# Patient Record
Sex: MALE | Race: WHITE
[De-identification: names, ages, dates, MRNs, and addresses within clinical notes are randomized per-mention and may not be internally consistent; named-entity substitution may affect disease eponyms.]

---

## 2016-06-08 VITALS — DIASTOLIC BLOOD PRESSURE: 79 MMHG | SYSTOLIC BLOOD PRESSURE: 139 MMHG

## 2017-01-03 ENCOUNTER — HOSPITAL ENCOUNTER (OUTPATIENT)
Dept: HOSPITAL 61 - SLPLAB | Age: 67
Discharge: HOME | End: 2017-01-03
Attending: PHYSICIAN ASSISTANT
Payer: MEDICARE

## 2017-01-03 DIAGNOSIS — R06.83: ICD-10-CM

## 2017-01-03 DIAGNOSIS — G47.33: Primary | ICD-10-CM

## 2017-01-03 PROCEDURE — 95810 POLYSOM 6/> YRS 4/> PARAM: CPT

## 2017-01-04 NOTE — SLEEP
DATE OF STUDY:  01/03/2017



ATTENDING PHYSICIAN:  Dr. Yanet Tan.



INDICATIONS:  The patient is 66 years old who weighs 229 pounds with a BMI of

46.  The patient's Dodson score was 8.  Sleep study was performed at [facility]

sleep lab to rule out CLEM.



During the night of the study, the patient spent 423 minutes in bed and slept

for 337 minutes with a sleep efficiency of 80%.  Sleep latency was 13 minutes

with a REM latency of 312 minutes.  Overall, sleep architecture showed increased

stage I sleep, normal stage II sleep, normal slow wave, and reduced REM sleep.



During the night of the study, the patient had one obstructive apnea, no mixed

or central apneas.  There were 7 hypopneas.  The patient's apnea hypopnea index

was 7 per hour.  Supine index 13 per hour with a REM index of 10 per hour.



Review of nocturnal oximetry study revealed a mean oxygen saturation of 96% with

the lowest of 87%.  18.5% of time oxygen saturation remained between 80% and 89%

and predominate was between 87-89%.



EKG monitoring revealed average heart rate of 68 beats per minute.  Normal sinus

rhythm.  No sustained arrhythmias were observed.



PLMS were seen at index of 68 per hour and 1 per hour caused EEG arousals.



Due to low AHI, the patient did not meet the criteria for CPAP initiation.



IMPRESSION:

1.  Mild sleep apnea-hypopnea syndrome with an AHI of 7 per hour.

2.  Mild nocturnal hypoxia, suspect secondary to obesity hypoventilation

syndrome.

3.  Severe periodic limb movements of sleep at an index of 68 per hour, but only

1 per hour caused EEG arousals.



RECOMMENDATIONS:

1.  The patient did not meet the criteria for CPAP initiation due to low AHI.

2.  Weight loss is first recommended to treat the patient's mild sleep apnea. 

In addition, the patient should also avoid sleeping in supine position.

3.  If patient remains clinically symptomatic despite above recommendations, 
then other treatment options at

that time would include a trial of an oral appliance or CPAP titration.

4.  Caution regarding driving until the patient's hypersomnia is resolved.

5.  PLMS does not need to be treated unless the patient has symptoms of restless

legs during the day.

 



______________________________

MOISÉS SHAH MD



DR:  CANDIDA/rosa elena  JOB#:  [id number] / [id number]

DD:  01/04/2017 09:04  DT:  01/04/2017 10:40



YANET Tellez MD

MTDD

## 2017-09-08 ENCOUNTER — HOSPITAL ENCOUNTER (EMERGENCY)
Dept: HOSPITAL 61 - ER | Age: 67
Discharge: HOME | End: 2017-09-08
Payer: MEDICARE

## 2017-09-08 ENCOUNTER — HOSPITAL ENCOUNTER (OUTPATIENT)
Dept: HOSPITAL 61 - SURG | Age: 67
Discharge: HOME | End: 2017-09-08
Attending: SURGERY
Payer: MEDICARE

## 2017-09-08 VITALS — WEIGHT: 240 LBS | HEIGHT: 69 IN | BODY MASS INDEX: 35.55 KG/M2

## 2017-09-08 VITALS — BODY MASS INDEX: 35.55 KG/M2 | HEIGHT: 69 IN | WEIGHT: 240 LBS

## 2017-09-08 VITALS — SYSTOLIC BLOOD PRESSURE: 112 MMHG | DIASTOLIC BLOOD PRESSURE: 72 MMHG

## 2017-09-08 VITALS — DIASTOLIC BLOOD PRESSURE: 90 MMHG | SYSTOLIC BLOOD PRESSURE: 140 MMHG

## 2017-09-08 DIAGNOSIS — M19.91: ICD-10-CM

## 2017-09-08 DIAGNOSIS — K42.9: Primary | ICD-10-CM

## 2017-09-08 DIAGNOSIS — Z91.041: ICD-10-CM

## 2017-09-08 DIAGNOSIS — Z88.6: ICD-10-CM

## 2017-09-08 DIAGNOSIS — E78.00: ICD-10-CM

## 2017-09-08 DIAGNOSIS — E11.9: ICD-10-CM

## 2017-09-08 DIAGNOSIS — E66.9: ICD-10-CM

## 2017-09-08 DIAGNOSIS — I10: ICD-10-CM

## 2017-09-08 DIAGNOSIS — Z88.8: ICD-10-CM

## 2017-09-08 DIAGNOSIS — Z88.5: ICD-10-CM

## 2017-09-08 DIAGNOSIS — K21.9: ICD-10-CM

## 2017-09-08 DIAGNOSIS — R33.9: Primary | ICD-10-CM

## 2017-09-08 DIAGNOSIS — J45.909: ICD-10-CM

## 2017-09-08 DIAGNOSIS — Z98.890: ICD-10-CM

## 2017-09-08 DIAGNOSIS — Z87.39: ICD-10-CM

## 2017-09-08 DIAGNOSIS — Z72.0: ICD-10-CM

## 2017-09-08 LAB
BACTERIA #/AREA URNS HPF: 0 /HPF
BILIRUB UR QL STRIP: NEGATIVE
GLUCOSE UR STRIP-MCNC: NEGATIVE MG/DL
NITRITE UR QL STRIP: NEGATIVE
PH UR STRIP: 6.5 [PH]
PROT UR STRIP-MCNC: NEGATIVE MG/DL
RBC #/AREA URNS HPF: 0 /HPF (ref 0–2)
SP GR UR STRIP: 1.01
UROBILINOGEN UR-MCNC: 0.2 MG/DL
WBC #/AREA URNS HPF: 0 /HPF (ref 0–4)

## 2017-09-08 PROCEDURE — S0028 INJECTION, FAMOTIDINE, 20 MG: HCPCS

## 2017-09-08 PROCEDURE — 49585: CPT

## 2017-09-08 PROCEDURE — 81001 URINALYSIS AUTO W/SCOPE: CPT

## 2017-09-08 PROCEDURE — A4215 STERILE NEEDLE: HCPCS

## 2017-09-08 PROCEDURE — 99283 EMERGENCY DEPT VISIT LOW MDM: CPT

## 2017-09-08 PROCEDURE — 82962 GLUCOSE BLOOD TEST: CPT

## 2017-09-08 PROCEDURE — P9612 CATHETERIZE FOR URINE SPEC: HCPCS

## 2017-09-08 NOTE — PDOC
BRIEF OPERATIVE NOTE


Pre-Op Diagnosis


#[id number]





umbilical hernia


umb hernia repair with mesh


k ponnuru


gen


ebl 10


ivf 1000


daniel well 


to rr stable.











RONI GRIFFIN MD Sep 8, 2017 09:42

## 2017-09-08 NOTE — OP
DATE OF SURGERY:  09/08/2017



PREOPERATIVE DIAGNOSIS:  Umbilical hernia.



POSTOPERATIVE DIAGNOSIS:  Umbilical hernia.



PROCEDURE:  Umbilical hernia repair with mesh.



SURGEON:  Roni Griffin MD



ANESTHESIA:  General.



ESTIMATED BLOOD LOSS:  10 mL



IV FLUIDS:  1000 mL



INDICATIONS:  The patient is a 67-year-old male who presents with an umbilical

hernia that he would like to have it repaired, it is symptomatic.



FINDINGS:  He had a 2 x 3 cm hernia defect.  His fascia was very thin.  He has a

diastasis recti and his subcutaneous fat was also very sparse.  The dermis of

the umbilicus was adherent to the fascia and had to be removed with cautery.  At

the completion of the procedure, the umbilical skin appeared viable and healthy.



PROCEDURE IN DETAIL:  After informed consent was obtained, the patient was taken

to the operating room and placed in supine position.  After adequate induction

of general anesthesia, he was prepped and draped in usual sterile fashion.  A

supraumbilical skin incision was made with a scalpel.  Subcutaneous tissue

divided with cautery.  The fascia was quickly encountered.  He had a very

minimal amount of subcutaneous fat.  This was somewhat unexpected since his BMI

is 35.  The dermis of the umbilicus was  from the hernia sac and then

from its attachment to the fascia, there was minimal amount of subcutaneous

fat to preserve the blood supply to the dermis.  At this point, the fascial was

inspected.  He had a very small 2-3 mm defect above the primary defect, but the

primary defect measured 2 x 3 cm.  The hernia was repaired with an 8 cm Bard

Ventralex patch that was placed beneath the fascia and sutured in place with 8

interrupted U sutures using 0 Prolene to secure the periphery of the mesh to the

abdominal wall.  The area was irrigated.  The fascia was injected with local

anesthetic.  The skin had been injected with local anesthetic at the beginning

of the procedure.  The fascia was then closed over the mesh with 0 Prolene in a

running fashion and then the dermis of the umbilicus was tacked back down to the

fascia with 3-0 Vicryl x 2.  The skin was closed with 4-0 Monocryl in

subcuticular fashion.  Sterile dressings were placed, which consisted of a

cotton ball, followed by Mastisol, Steri-Strips, and Tegaderm.  He tolerated the

procedure well.  He has been transferred in stable condition to the recovery

room.

 



______________________________

RONI GRIFFIN MD



DR:  VERA/rosa elena  JOB#:  [id number] / [id number]

DD:  09/08/2017 09:41  DT:  09/08/2017 10:06



YANET Tellez MD, JENNIFER TAN

## 2017-09-09 ENCOUNTER — HOSPITAL ENCOUNTER (INPATIENT)
Dept: HOSPITAL 61 - ER | Age: 67
LOS: 4 days | Discharge: HOME | DRG: 394 | End: 2017-09-13
Attending: FAMILY MEDICINE | Admitting: FAMILY MEDICINE
Payer: MEDICARE

## 2017-09-09 VITALS — WEIGHT: 240 LBS | HEIGHT: 69 IN | BODY MASS INDEX: 35.55 KG/M2

## 2017-09-09 DIAGNOSIS — E78.5: ICD-10-CM

## 2017-09-09 DIAGNOSIS — I10: ICD-10-CM

## 2017-09-09 DIAGNOSIS — L03.113: ICD-10-CM

## 2017-09-09 DIAGNOSIS — E11.9: ICD-10-CM

## 2017-09-09 DIAGNOSIS — K56.7: ICD-10-CM

## 2017-09-09 DIAGNOSIS — E86.0: ICD-10-CM

## 2017-09-09 DIAGNOSIS — R33.9: ICD-10-CM

## 2017-09-09 DIAGNOSIS — L03.311: ICD-10-CM

## 2017-09-09 DIAGNOSIS — K91.3: Primary | ICD-10-CM

## 2017-09-09 DIAGNOSIS — R06.6: ICD-10-CM

## 2017-09-09 DIAGNOSIS — Z79.4: ICD-10-CM

## 2017-09-09 DIAGNOSIS — Z88.6: ICD-10-CM

## 2017-09-09 DIAGNOSIS — Z88.8: ICD-10-CM

## 2017-09-09 PROCEDURE — 96361 HYDRATE IV INFUSION ADD-ON: CPT

## 2017-09-09 PROCEDURE — 83880 ASSAY OF NATRIURETIC PEPTIDE: CPT

## 2017-09-09 PROCEDURE — 84484 ASSAY OF TROPONIN QUANT: CPT

## 2017-09-09 PROCEDURE — 74176 CT ABD & PELVIS W/O CONTRAST: CPT

## 2017-09-09 PROCEDURE — 80048 BASIC METABOLIC PNL TOTAL CA: CPT

## 2017-09-09 PROCEDURE — 74022 RADEX COMPL AQT ABD SERIES: CPT

## 2017-09-09 PROCEDURE — G0479 DRUG TEST PRESUMP NOT OPT: HCPCS

## 2017-09-09 PROCEDURE — 83735 ASSAY OF MAGNESIUM: CPT

## 2017-09-09 PROCEDURE — 82553 CREATINE MB FRACTION: CPT

## 2017-09-09 PROCEDURE — 85027 COMPLETE CBC AUTOMATED: CPT

## 2017-09-09 PROCEDURE — 85025 COMPLETE CBC W/AUTO DIFF WBC: CPT

## 2017-09-09 PROCEDURE — 82962 GLUCOSE BLOOD TEST: CPT

## 2017-09-09 PROCEDURE — 80076 HEPATIC FUNCTION PANEL: CPT

## 2017-09-09 PROCEDURE — 84443 ASSAY THYROID STIM HORMONE: CPT

## 2017-09-09 PROCEDURE — 81001 URINALYSIS AUTO W/SCOPE: CPT

## 2017-09-09 PROCEDURE — 96374 THER/PROPH/DIAG INJ IV PUSH: CPT

## 2017-09-09 PROCEDURE — 85610 PROTHROMBIN TIME: CPT

## 2017-09-09 PROCEDURE — 93005 ELECTROCARDIOGRAM TRACING: CPT

## 2017-09-09 PROCEDURE — 83690 ASSAY OF LIPASE: CPT

## 2017-09-09 PROCEDURE — 80307 DRUG TEST PRSMV CHEM ANLYZR: CPT

## 2017-09-09 PROCEDURE — 71010: CPT

## 2017-09-09 PROCEDURE — P9612 CATHETERIZE FOR URINE SPEC: HCPCS

## 2017-09-09 PROCEDURE — 74000: CPT

## 2017-09-09 PROCEDURE — 36415 COLL VENOUS BLD VENIPUNCTURE: CPT

## 2017-09-09 PROCEDURE — 74020: CPT

## 2017-09-09 NOTE — PHYS DOC
Past Medical History


Past Medical History:  Diabetes-Type II, High Cholesterol, Hypertension, Other


Additional Past Medical Histor:  hernia


Past Surgical History:  Other


Additional Past Surgical Histo:  BILATERAL SHOULDER REPAIR; (L) KNEE REPAIR; 

hernia


Alcohol Use:  Occasionally


Drug Use:  None





Adult General


Chief Complaint


Chief Complaint:  MULTIPLE COMPLAINTS





HPI


HPI





Patient is a 67  year old  male who presents with nausea vomiting and 

a syncopal episode. He states his symptoms started tonight around 7:00 when he 

started having vomiting and vomited about 4 times nonbloody nonbilious 

material. In the course of vomiting he passed out. He also states he feels 

exhausted and short of breath like he just ran a brace. He denies any chest 

pain. He states that he had his hernia repair on 2017 and since 

that he's not had any flatus or stools. He also states his abdomen is 

distended. He also states he had to come in yesterday because of urinary 

retention to have his bladder drained. He states his been urinating fine 

without any difficulty.





Review of Systems


Review of Systems





Constitutional: Denies fever or chills []


Eyes: Denies change in visual acuity, redness, or eye pain []


HENT: Denies nasal congestion or sore throat []


Respiratory: Denies cough or shortness of breath []


Cardiovascular: No additional information not addressed in HPI []


GI: Positive for abdominal pain, nausea, vomiting, denies any bloody stools or 

diarrhea []


: Denies dysuria or hematuria []


Musculoskeletal: Denies back pain or joint pain []


Integument: Denies rash or skin lesions []


Neurologic: Denies headache, focal weakness or sensory changes []


Endocrine: Denies polyuria or polydipsia []





Current Medications


Current Medications





Current Medications








 Medications


  (Trade)  Dose


 Ordered  Sig/Luci  Start Time


 Stop Time Status Last Admin


Dose Admin


 


 Ondansetron HCl


  (Zofran)  4 mg  1X  ONCE  9/10/17 00:00


 9/10/17 00:01 DC 9/10/17 00:28


4 MG


 


 Sodium Chloride  1,000 ml @ 


 1,000 mls/hr  1X  ONCE  9/10/17 00:00


 9/10/17 00:59 DC 9/10/17 00:30


1,000 MLS/HR











Allergies


Allergies





Allergies








Coded Allergies Type Severity Reaction Last Updated Verified


 


  iodine Allergy Severe Anaphylaxis 17 Yes


 


  metformin Allergy Intermediate  17 Yes


 


  morphine Allergy Intermediate Nausea and Vomiting 17 Yes


 


  naproxen sodium Allergy Intermediate Rash 17 Yes


 


  simvastatin Allergy Intermediate  17 Yes











Physical Exam


Physical Exam





Constitutional: Well developed, well nourished, no acute distress, non-toxic 

appearance. []


HENT: Normocephalic, atraumatic, bilateral external ears normal, oropharynx 

moist, no oral exudates, nose normal. []


Eyes: PERRLA, EOMI, conjunctiva normal, no discharge. [] 


Neck: Normal range of motion, no tenderness, supple, no stridor. [] 


Cardiovascular:Heart rate regular rhythm, no murmur []


Lungs & Thorax:  Bilateral breath sounds clear to auscultation []


Abdomen: Bowel sounds normal, soft, distended with hypoactive occasional high-

pitched sounds, dressing over the umbilicus with mild erythema extending 

partially 6 cm below, no masses, no pulsatile masses. [] 


Skin: Warm, dry, no erythema, no rash. [] 


Back: No tenderness, no CVA tenderness. [] 


Extremities: No tenderness, no cyanosis, no clubbing, ROM intact, no edema. [] 


Neurologic: Alert and oriented X 3, normal motor function, normal sensory 

function, no focal deficits noted. []


Psychologic: Affect normal, judgement normal, mood normal. []





Current Patient Data


Vital Signs





 Vital Signs








  Date Time  Temp Pulse Resp B/P (MAP) Pulse Ox O2 Delivery O2 Flow Rate FiO2


 


9/10/17 03:05     88 Room Air  


 


9/10/17 02:30  76 11 132/73 (92)    


 


17 23:37 97.9       





 97.9       








Lab Values





 Laboratory Tests








Test


  17


23:48 9/10/17


02:45 9/10/17


03:56


 


White Blood Count


  17.6 x10^3/uL


(4.0-11.0)  H 


  


 


 


Red Blood Count


  5.73 x10^6/uL


(4.30-5.70)  H 


  


 


 


Hemoglobin


  17.2 g/dL


(13.0-17.5) 


  


 


 


Hematocrit


  51.7 %


(39.0-53.0) 


  


 


 


Mean Corpuscular Volume


  90 fL ()


  


  


 


 


Mean Corpuscular Hemoglobin 30 pg (25-35)    


 


Mean Corpuscular Hemoglobin


Concent 33 g/dL


(31-37) 


  


 


 


Red Cell Distribution Width


  13.9 %


(11.5-14.5) 


  


 


 


Platelet Count


  187 x10^3/uL


(140-400) 


  


 


 


Neutrophils (%) (Auto) 85 % (31-73)  H  


 


Lymphocytes (%) (Auto) 7 % (24-48)  L  


 


Monocytes (%) (Auto) 7 % (0-9)    


 


Eosinophils (%) (Auto) 1 % (0-3)    


 


Basophils (%) (Auto) 0 % (0-3)    


 


Neutrophils # (Auto)


  14.9 x10^3uL


(1.8-7.7)  H 


  


 


 


Lymphocytes # (Auto)


  1.2 x10^3/uL


(1.0-4.8) 


  


 


 


Monocytes # (Auto)


  1.3 x10^3/uL


(0.0-1.1)  H 


  


 


 


Eosinophils # (Auto)


  0.2 x10^3/uL


(0.0-0.7) 


  


 


 


Basophils # (Auto)


  0.1 x10^3/uL


(0.0-0.2) 


  


 


 


Prothrombin Time


  13.9 SEC


(11.7-14.0) 


  


 


 


Prothrombin Time INR 1.1 (0.8-1.1)    


 


Sodium Level


  139 mmol/L


(136-145) 


  


 


 


Potassium Level


  4.0 mmol/L


(3.5-5.1) 


  


 


 


Chloride Level


  97 mmol/L


()  L 


  


 


 


Carbon Dioxide Level


  36 mmol/L


(21-32)  H 


  


 


 


Anion Gap 6 (6-14)    


 


Blood Urea Nitrogen


  14 mg/dL


(8-26) 


  


 


 


Creatinine


  1.0 mg/dL


(0.7-1.3) 


  


 


 


Estimated GFR


(Cockcroft-Gault) 74.8  


  


  


 


 


Glucose Level


  142 mg/dL


(70-99)  H 


  


 


 


Calcium Level


  10.1 mg/dL


(8.5-10.1) 


  


 


 


Magnesium Level


  1.9 mg/dL


(1.8-2.4) 


  


 


 


Total Bilirubin


  0.5 mg/dL


(0.2-1.0) 


  


 


 


Direct Bilirubin


  < 0.1 mg/dL


(0.0-0.2) 


  


 


 


Aspartate Amino Transferase


(AST) 33 U/L (15-37)


  


  


 


 


Alanine Aminotransferase (ALT)


  33 U/L (16-63)


  


  


 


 


Alkaline Phosphatase


  79 U/L


() 


  


 


 


Creatine Kinase


  511 U/L


()  H 


  


 


 


Creatine Kinase MB (Mass)


  7.6 ng/mL


(0.0-3.6)  H 


  


 


 


Creatine Kinase MB Relative


Index 1.5 % (0-4)  


  


  


 


 


Troponin I Quantitative


  < 0.017 ng/mL


(0.000-0.055) 


  < 0.017 ng/mL


(0.000-0.055)


 


NT-Pro-B-Type Natriuretic


Peptide 119 pg/mL


(0-124) 


  


 


 


Total Protein


  7.5 g/dL


(6.4-8.2) 


  


 


 


Albumin


  4.2 g/dL


(3.4-5.0) 


  


 


 


Lipase


  140 U/L


() 


  


 


 


Thyroid Stimulating Hormone


(TSH) 4.216 uIU/mL


(0.358-3.74)  H 


  


 


 


Urine Collection Type  Unknown   


 


Urine Color  Yellow   


 


Urine Clarity  Clear   


 


Urine pH  7.0   


 


Urine Specific Gravity  1.010   


 


Urine Protein


  


  Negative mg/dL


(NEG-TRACE) 


 


 


Urine Glucose (UA)


  


  Negative mg/dL


(NEG) 


 


 


Urine Ketones (Stick)


  


  Negative mg/dL


(NEG) 


 


 


Urine Blood


  


  Negative (NEG)


  


 


 


Urine Nitrite


  


  Negative (NEG)


  


 


 


Urine Bilirubin


  


  Negative (NEG)


  


 


 


Urine Urobilinogen Dipstick


  


  0.2 mg/dL (0.2


mg/dL) 


 


 


Urine Leukocyte Esterase


  


  Negative (NEG)


  


 


 


Urine RBC


  


  Occ /HPF (0-2)


  


 


 


Urine WBC


  


  Occ /HPF (0-4)


  


 


 


Urine Squamous Epithelial


Cells 


  Few /LPF  


  


 


 


Urine Amorphous Sediment  Present /HPF   


 


Urine Bacteria


  


  0 /HPF (0-FEW)


  


 


 


Urine Opiates Screen  Neg (NEG)   


 


Urine Methadone Screen  Neg (NEG)   


 


Urine Barbiturates  Neg (NEG)   


 


Urine Phencyclidine Screen  Neg (NEG)   


 


Urine


Amphetamine/Methamphetamine 


  Neg (NEG)  


  


 


 


Urine Benzodiazepines Screen  Neg (NEG)   


 


Urine Cocaine Screen  Neg (NEG)   


 


Urine Cannabinoids Screen  Neg (NEG)   


 


Urine Ethyl Alcohol  Neg (NEG)   





 Laboratory Tests


17 23:48








 Laboratory Tests


17 23:48











EKG


EKG


EKG shows sinus rhythm with rate of 57 bpm without any ST elevations, T-wave 

inversions noted in leads aVL, V1 V2, normal axis,  ms, as interpreted 

by me.





Radiology/Procedures


Radiology/Procedures


 [facility]


 [address] [place] [address]


 [phone]


 


 IMAGING REPORT





 Signed





PATIENT: JUVE QUINTERO ACCOUNT: [id number] MRN#: [id number]


: 1950 LOCATION: ER AGE: 67


SEX: M EXAM DT: 09/10/17 ACCESSION#: [id number]


STATUS: REG ER ORD. PHYSICIAN: JAYANT WHEAT MD 


REASON: pain


PROCEDURE: CT ABDOMEN PELVIS WO CONTRAST





 


INDICATION: abd distension


 


COMPARISON: None.


 


TECHNIQUE: 


 


Axial CT images were obtained through the abdomen and pelvis without 


intravenous contrast.  


Limited assessment of solid organ structures and vasculature secondary to 


lack of intravenous contrast.


 


One or more of the following individualized dose reduction techniques were


utilized for this examination:  1. Automated exposure control;  2. 


Adjustment of the mA and/or kV according to patient size;  3. Use of 


iterative reconstruction technique.


 


FINDINGS:


 


Mild probable atelectasis at lung bases.


Mild calcific atherosclerosis without abdominal aortic aneurysm.


Small left greater than right fat-containing inguinal hernia.


Couple of Subcentimeter low-attenuation right lobe of liver lesion, not 


well characterized on this exam. No intrahepatic bile duct dilation.


Gallbladder is contracted.


No peripancreatic edema.


Spleen unremarkable.


No hydronephrosis.


Bladder is partially distended at time of exam.


There is some subcutaneous edema at the ventral abdominal wall with some 


subcutaneous air.


Colonic diverticulosis.


There is dilation of proximal loops of bowel with distal decompression. 


There is also dilatation of the stomach.


Degenerative changes spine. This contributes to multilevel central canal 


neural foraminal stenosis.


 


IMPRESSION:


 


1. Dilatation of the stomach and proximal small bowel loops with more 


distal decompression. This can be seen in small bowel obstruction.


 


 


2. There is subcutaneous air and edema anterior abdominal and pelvic wall.


Would correlate for possible causes such as posttraumatic or injection 


related. If no history of injection or trauma other possible causes 


include infection.


 


3. Subcentimeter low-attenuation liver lesions which are too small to 


characterize on this exam.


 


Electronically signed by: Janice Herndon MD (9/10/2017 2:30 AM) [facility]














DICTATED and SIGNED BY:     JANICE HERNDON MD


DATE:     09/10/17 0216





CC: JAYANT WHEAT MD; YANET HERNDON MD ~


Impressions:


Bowel obstruction


Syncope





Course & Med Decision Making


Course & Med Decision Making


Pertinent Labs and Imaging studies reviewed. (See chart for details)





Labs show any acute abnormality's. CT scan shows obstruction with an area of 

concern along the abdominal and pelvic wall. I spoke with Dr. Estrada regarding 

the CT scan, his vitals, his presentation and labs. Patient's being admitted 

for his bowel obstruction. His pain is controlled at this time. We will write 

interim orders for D5 half normal saline. Spoke with Dr. Yanet Herndon who 

agrees with the plan. I believe his syncopal episode occurred while he was 

vomiting is likely vasovagal in nature. Will put on telemetry and repeat 

troponins. He's been ER for 5 hours and has not had any other complaints.





Dragon Disclaimer


Dragon Disclaimer


This electronic medical record was generated, in whole or in part, using a 

voice recognition dictation system.





Departure


Departure


Impression:  


 Primary Impression:  


 Bowel obstruction


Disposition:   HOME, SELF-CARE


Admitting Physician:  Yanet Herndon


Condition:  STABLE


Referrals:  


YANET HERNDON MD (PCP)





Problem Qualifiers








 Primary Impression:  


 Bowel obstruction


 Intestinal obstruction type:  other intestinal obstruction  Qualified Codes:  

K56.69 - Other intestinal obstruction








JAYANT WHEAT MD Sep 9, 2017 23:47

## 2017-09-10 VITALS — SYSTOLIC BLOOD PRESSURE: 140 MMHG | DIASTOLIC BLOOD PRESSURE: 85 MMHG

## 2017-09-10 VITALS — SYSTOLIC BLOOD PRESSURE: 158 MMHG | DIASTOLIC BLOOD PRESSURE: 85 MMHG

## 2017-09-10 VITALS — DIASTOLIC BLOOD PRESSURE: 88 MMHG | SYSTOLIC BLOOD PRESSURE: 147 MMHG

## 2017-09-10 VITALS — DIASTOLIC BLOOD PRESSURE: 82 MMHG | SYSTOLIC BLOOD PRESSURE: 133 MMHG

## 2017-09-10 VITALS — DIASTOLIC BLOOD PRESSURE: 90 MMHG | SYSTOLIC BLOOD PRESSURE: 148 MMHG

## 2017-09-10 LAB
ALBUMIN SERPL-MCNC: 4.2 G/DL (ref 3.4–5)
ALP SERPL-CCNC: 79 U/L (ref 46–116)
ALT SERPL-CCNC: 33 U/L (ref 16–63)
ANION GAP SERPL CALC-SCNC: 6 MMOL/L (ref 6–14)
AST SERPL-CCNC: 33 U/L (ref 15–37)
BACTERIA #/AREA URNS HPF: 0 /HPF
BARBITURATES UR-MCNC: (no result) UG/ML
BASOPHILS # BLD AUTO: 0 X10^3/UL (ref 0–0.2)
BASOPHILS # BLD AUTO: 0.1 X10^3/UL (ref 0–0.2)
BASOPHILS NFR BLD: 0 % (ref 0–3)
BASOPHILS NFR BLD: 0 % (ref 0–3)
BENZODIAZ UR-MCNC: (no result) UG/L
BILIRUB DIRECT SERPL-MCNC: < 0.1 MG/DL (ref 0–0.2)
BILIRUB SERPL-MCNC: 0.5 MG/DL (ref 0.2–1)
BILIRUB UR QL STRIP: NEGATIVE
BUN SERPL-MCNC: 14 MG/DL (ref 8–26)
CALCIUM SERPL-MCNC: 10.1 MG/DL (ref 8.5–10.1)
CANNABINOIDS UR-MCNC: (no result) UG/L
CHLORIDE SERPL-SCNC: 97 MMOL/L (ref 98–107)
CK SERPL-CCNC: 511 U/L (ref 39–308)
CKMB MASS: 7.6 NG/ML (ref 0–3.6)
CO2 SERPL-SCNC: 36 MMOL/L (ref 21–32)
COCAINE UR-MCNC: (no result) NG/ML
CREAT SERPL-MCNC: 1 MG/DL (ref 0.7–1.3)
EOSINOPHIL NFR BLD: 1 % (ref 0–3)
EOSINOPHIL NFR BLD: 2 % (ref 0–3)
ERYTHROCYTE [DISTWIDTH] IN BLOOD BY AUTOMATED COUNT: 13.9 % (ref 11.5–14.5)
ERYTHROCYTE [DISTWIDTH] IN BLOOD BY AUTOMATED COUNT: 14.3 % (ref 11.5–14.5)
GFR SERPLBLD BASED ON 1.73 SQ M-ARVRAT: 74.8 ML/MIN
GLUCOSE SERPL-MCNC: 142 MG/DL (ref 70–99)
GLUCOSE UR STRIP-MCNC: NEGATIVE MG/DL
HCT VFR BLD CALC: 47.3 % (ref 39–53)
HCT VFR BLD CALC: 51.7 % (ref 39–53)
HGB BLD-MCNC: 15.6 G/DL (ref 13–17.5)
HGB BLD-MCNC: 17.2 G/DL (ref 13–17.5)
INR PPP: 1.1 (ref 0.8–1.1)
LYMPHOCYTES # BLD: 1.1 X10^3/UL (ref 1–4.8)
LYMPHOCYTES # BLD: 1.2 X10^3/UL (ref 1–4.8)
LYMPHOCYTES NFR BLD AUTO: 7 % (ref 24–48)
LYMPHOCYTES NFR BLD AUTO: 9 % (ref 24–48)
MAGNESIUM SERPL-MCNC: 1.9 MG/DL (ref 1.8–2.4)
MCH RBC QN AUTO: 30 PG (ref 25–35)
MCH RBC QN AUTO: 30 PG (ref 25–35)
MCHC RBC AUTO-ENTMCNC: 33 G/DL (ref 31–37)
MCHC RBC AUTO-ENTMCNC: 33 G/DL (ref 31–37)
MCV RBC AUTO: 90 FL (ref 79–100)
MCV RBC AUTO: 91 FL (ref 79–100)
METHADONE SERPL-MCNC: (no result) NG/ML
MONOCYTES NFR BLD: 7 % (ref 0–9)
MONOCYTES NFR BLD: 8 % (ref 0–9)
NEUTROPHILS NFR BLD AUTO: 81 % (ref 31–73)
NEUTROPHILS NFR BLD AUTO: 85 % (ref 31–73)
NITRITE UR QL STRIP: NEGATIVE
OPIATES UR-MCNC: (no result) NG/ML
PCP SERPL-MCNC: (no result) MG/DL
PH UR STRIP: 7 [PH]
PLATELET # BLD AUTO: 171 X10^3/UL (ref 140–400)
PLATELET # BLD AUTO: 187 X10^3/UL (ref 140–400)
POTASSIUM SERPL-SCNC: 4 MMOL/L (ref 3.5–5.1)
PROT SERPL-MCNC: 7.5 G/DL (ref 6.4–8.2)
PROT UR STRIP-MCNC: NEGATIVE MG/DL
PROTHROMBIN TIME: 13.9 SEC (ref 11.7–14)
RBC # BLD AUTO: 5.21 X10^6/UL (ref 4.3–5.7)
RBC # BLD AUTO: 5.73 X10^6/UL (ref 4.3–5.7)
RBC #/AREA URNS HPF: (no result) /HPF (ref 0–2)
SODIUM SERPL-SCNC: 139 MMOL/L (ref 136–145)
SP GR UR STRIP: 1.01
SQUAMOUS #/AREA URNS LPF: (no result) /LPF
UROBILINOGEN UR-MCNC: 0.2 MG/DL
WBC # BLD AUTO: 12 X10^3/UL (ref 4–11)
WBC # BLD AUTO: 17.6 X10^3/UL (ref 4–11)
WBC #/AREA URNS HPF: (no result) /HPF (ref 0–4)

## 2017-09-10 PROCEDURE — 0D9670Z DRAINAGE OF STOMACH WITH DRAINAGE DEVICE, VIA NATURAL OR ARTIFICIAL OPENING: ICD-10-PCS | Performed by: RADIOLOGY

## 2017-09-10 RX ADMIN — INSULIN ASPART SCH UNITS: 100 INJECTION, SOLUTION INTRAVENOUS; SUBCUTANEOUS at 17:00

## 2017-09-10 RX ADMIN — FENTANYL CITRATE PRN MCG: 50 INJECTION INTRAMUSCULAR; INTRAVENOUS at 20:31

## 2017-09-10 RX ADMIN — BACITRACIN SCH MLS/HR: 5000 INJECTION, POWDER, FOR SOLUTION INTRAMUSCULAR at 16:33

## 2017-09-10 RX ADMIN — INSULIN ASPART SCH UNITS: 100 INJECTION, SOLUTION INTRAVENOUS; SUBCUTANEOUS at 12:00

## 2017-09-10 RX ADMIN — FENTANYL CITRATE PRN MCG: 50 INJECTION INTRAMUSCULAR; INTRAVENOUS at 11:00

## 2017-09-10 RX ADMIN — FENTANYL CITRATE PRN MCG: 50 INJECTION INTRAMUSCULAR; INTRAVENOUS at 16:39

## 2017-09-10 RX ADMIN — BACITRACIN SCH MLS/HR: 5000 INJECTION, POWDER, FOR SOLUTION INTRAMUSCULAR at 18:45

## 2017-09-10 RX ADMIN — ONDANSETRON PRN MG: 2 INJECTION INTRAMUSCULAR; INTRAVENOUS at 20:31

## 2017-09-10 RX ADMIN — FENTANYL CITRATE PRN MCG: 50 INJECTION INTRAMUSCULAR; INTRAVENOUS at 13:42

## 2017-09-10 RX ADMIN — ONDANSETRON PRN MG: 2 INJECTION INTRAMUSCULAR; INTRAVENOUS at 12:27

## 2017-09-10 NOTE — RAD
INDICATION: abd distension

 

COMPARISON: None.

 

TECHNIQUE: 

 

Axial CT images were obtained through the abdomen and pelvis without 

intravenous contrast.  

Limited assessment of solid organ structures and vasculature secondary to 

lack of intravenous contrast.

 

One or more of the following individualized dose reduction techniques were

utilized for this examination:  1. Automated exposure control;  2. 

Adjustment of the mA and/or kV according to patient size;  3. Use of 

iterative reconstruction technique.

 

FINDINGS:

 

Mild probable atelectasis at lung bases.

Mild calcific atherosclerosis without abdominal aortic aneurysm.

Small left greater than right fat-containing inguinal hernia.

Couple of Subcentimeter low-attenuation right lobe of liver lesion, not 

well characterized on this exam. No intrahepatic bile duct dilation.

Gallbladder is contracted.

No peripancreatic edema.

Spleen unremarkable.

No hydronephrosis.

Bladder is partially distended at time of exam.

There is some subcutaneous edema at the ventral abdominal wall with some 

subcutaneous air.

Colonic diverticulosis.

There is dilation of proximal loops of bowel with distal decompression. 

There is also dilatation of the stomach.

Degenerative changes spine. This contributes to multilevel central canal 

neural foraminal stenosis.

 

IMPRESSION:

 

1. Dilatation of the stomach and proximal small bowel loops with more 

distal decompression. This can be seen in small bowel obstruction.

 

 

2. There is subcutaneous air and edema anterior abdominal and pelvic wall.

Would correlate for possible causes such as posttraumatic or injection 

related. If no history of injection or trauma other possible causes 

include infection.

 

3. Subcentimeter low-attenuation liver lesions which are too small to 

characterize on this exam.

 

Electronically signed by: Jeremiah Tan MD (9/10/2017 2:30 AM) [facility]

## 2017-09-10 NOTE — RAD
Portable abdomen, 9/10/2017, 10:08 AM:



History: Check NG tube placement



Comparison is made to a study from earlier the same day. An NG tube has been

inserted with its tip overlying the fundal aspect of the stomach. A sidehole

lies near the level of the GE junction. There is a moderate amount of stool in

the right colon. The abdominal gas pattern is otherwise unremarkable.



IMPRESSION: Interval insertion of an NG tube extending into the fundus of the

stomach.

## 2017-09-10 NOTE — RAD
Abdomen, 2 views, 9/10/2017:



History: Abdominal distention



There is gas in the colon without significant colonic distention. There is

very little small bowel gas. Gastric and mild small bowel distention seen on

the recent CT study is not visible radiographically since those structures are

fluid-filled. No free air is seen in the abdomen. There is no evidence of

organomegaly. Wire-like opacities overlying the upper pelvis most likely lie

on the of the patient.





IMPRESSION: No acute radiographic abnormality is detected.

## 2017-09-10 NOTE — PDOC2
ASHU BAJWA APRN 9/10/17 0833:


CONSULT


Date of Consult


Date of Consult


DATE: 9/10/17 


TIME: 08:25





Reason for Consult


Reason for Consult:


s/p hernia, sbo





Referring Physician


Referring Physician:


ER





Identification/Chief Complaint


Chief Complaint


abdominal pain


Problems:  





Source


Source:  Chart review, Patient





History of Present Illness


Reason for Visit:


Underwent umbilical hernia repair on 9/8.  Discharged home, returned with 

urinary retention(now urinating without problem).  Reports hiccups since surgery

, abdominal distention, no flatus or stool.  Yesterday began vomiting and had a 

syncopal episode.  Currently still having bloating, hiccups, and nausea





Past Medical History


Cardiovascular:  HTN, Hyperlipidemia


Endocrine:  No pertinent hx





Past Surgical History


Past Surgical History:  Hernia Repair





Family History


Family History:  Other (noncontributory to current illness )





Social History


Quit


ALCOHOL:  rare


Drugs:  None





Current Problem List


Problem List


Problems


Medical Problems:


(1) Bowel obstruction


Status: Acute  











Current Medications


Current Medications





Current Medications


Sodium Chloride 1,000 ml @  1,000 mls/hr 1X  ONCE IV  Last administered on 9/10/

17at 00:30;  Start 9/10/17 at 00:00;  Stop 9/10/17 at 00:59;  Status DC


Ondansetron HCl (Zofran) 4 mg 1X  ONCE IV  Last administered on 9/10/17at 00:28

;  Start 9/10/17 at 00:00;  Stop 9/10/17 at 00:01;  Status DC


Ondansetron HCl (Zofran) 4 mg PRN Q8HRS  PRN IV NAUSEA/VOMITING;  Start 9/10/17 

at 05:30;  Stop 9/11/17 at 05:29


Fentanyl Citrate (Fentanyl 2ml Vial) 50 mcg PRN Q2HR  PRN IV PAIN;  Start 9/10/

17 at 05:30;  Stop 9/11/17 at 05:29


Dextrose/Sodium Chloride 1,000 ml @  100 mls/hr 1X  ONCE IV  Last administered 

on 9/10/17at 05:30;  Start 9/10/17 at 05:30;  Stop 9/10/17 at 15:29





Active Scripts


Active


Percocet 5-325 Mg Tablet (Oxycodone/Acetaminophen) 1 Each Tablet 1-2 Tab PO Q4-

6HRS


Reported


Alfuzosin Hcl 10 Mg Tab.er.24h 10 Mg PO DAILY


Ventolin Hfa Inhaler (Albuterol Sulfate) 18 Gm Hfa.aer.ad 18 Gm IH PRN DAILY


Ranitidine Hcl 150 Mg Tablet 150 Mg PO PRN PRN


Veramyst (Fluticasone Furoate) 10 Gm Spray.susp 10 Gm NS DAILY


Losartan Potassium 25 Mg Tablet 25 Mg PO QODAY


Januvia (Sitagliptin Phosphate) 100 Mg Tablet 50 Mg PO DAILY


Allergy (Cetirizine Hcl) 10 Mg Tablet 10 Mg PO DAILY





Allergies


Allergies:  


Coded Allergies:  


     iodine (Verified  Allergy, Severe, Anaphylaxis, 9/8/17)


     metformin (Verified  Allergy, Intermediate, 9/8/17)


 "FOOT CRAMPS"


     morphine (Verified  Allergy, Intermediate, Nausea and Vomiting, 9/8/17)


     naproxen sodium (Verified  Allergy, Intermediate, Rash, 9/8/17)


     simvastatin (Verified  Allergy, Intermediate, 9/8/17)


 MUSCLE CRAMPING





ROS


General:  YES: Chills, Other (subjective fevers )


PSYCHOLOGICAL ROS:  No: Anxiety, Depression


Eyes:  No Blurry vision, No Double vision


HEENT:  No: Heacaches, Sore Throat


Hematological and Lymphatic:  YES: Blood Clots (testicular), 


   No: Bleeding Problems


Respiratory:  YES: Shortness of breath, 


   No: Cough


Cardiovascular:  No Chest Pain, No Palpitations


Gastrointestinal:  Yes Other (see hpi)


Genitourinary:  YES Dysuria, 


   No Hematuria


Musculoskeletal:  No Joint Pain, No Muscle Pain


Neurological:  No Confusion, No Numbness/Tingling


Skin:  No Pruritus, No Rash





Physical Exam


General:  Alert, Oriented X3, Cooperative, No acute distress


HEENT:  PERRLA, Mucous membr. moist/pink


Lungs:  Clear to auscultation, Normal air movement


Heart:  Regular rate, Normal S1, Normal S2


Abdomen:  Soft, Other (moderate distention, tenderness to ruq/mild RLQ, umbo 

incision c/d/i, no erythema, no guarding or rebound)


Extremities:  No clubbing, No cyanosis


Skin:  No rashes, No breakdown


Neuro:  Normal gait, Normal speech


Psych/Mental Status:  Mental status NL, Mood NL


MUSCULOSKELETAL:  No deformity, No swelling





Vitals


VITALS





Vital Signs








  Date Time  Temp Pulse Resp B/P (MAP) Pulse Ox O2 Delivery O2 Flow Rate FiO2


 


9/10/17 07:00 98.8 75 18 133/82 (99) 93 Nasal Cannula 2.0 





 98.8       











Labs


Labs





Laboratory Tests








Test


  9/9/17


23:48 9/10/17


02:45 9/10/17


03:56 9/10/17


05:35


 


White Blood Count


  17.6 x10^3/uL


(4.0-11.0) 


  


  


 


 


Red Blood Count


  5.73 x10^6/uL


(4.30-5.70) 


  


  


 


 


Hemoglobin


  17.2 g/dL


(13.0-17.5) 


  


  


 


 


Hematocrit


  51.7 %


(39.0-53.0) 


  


  


 


 


Mean Corpuscular Volume 90 fL ()    


 


Mean Corpuscular Hemoglobin 30 pg (25-35)    


 


Mean Corpuscular Hemoglobin


Concent 33 g/dL


(31-37) 


  


  


 


 


Red Cell Distribution Width


  13.9 %


(11.5-14.5) 


  


  


 


 


Platelet Count


  187 x10^3/uL


(140-400) 


  


  


 


 


Neutrophils (%) (Auto) 85 % (31-73)    


 


Lymphocytes (%) (Auto) 7 % (24-48)    


 


Monocytes (%) (Auto) 7 % (0-9)    


 


Eosinophils (%) (Auto) 1 % (0-3)    


 


Basophils (%) (Auto) 0 % (0-3)    


 


Neutrophils # (Auto)


  14.9 x10^3uL


(1.8-7.7) 


  


  


 


 


Lymphocytes # (Auto)


  1.2 x10^3/uL


(1.0-4.8) 


  


  


 


 


Monocytes # (Auto)


  1.3 x10^3/uL


(0.0-1.1) 


  


  


 


 


Eosinophils # (Auto)


  0.2 x10^3/uL


(0.0-0.7) 


  


  


 


 


Basophils # (Auto)


  0.1 x10^3/uL


(0.0-0.2) 


  


  


 


 


Prothrombin Time


  13.9 SEC


(11.7-14.0) 


  


  


 


 


Prothromb Time International


Ratio 1.1 (0.8-1.1) 


  


  


  


 


 


Sodium Level


  139 mmol/L


(136-145) 


  


  


 


 


Potassium Level


  4.0 mmol/L


(3.5-5.1) 


  


  


 


 


Chloride Level


  97 mmol/L


() 


  


  


 


 


Carbon Dioxide Level


  36 mmol/L


(21-32) 


  


  


 


 


Anion Gap 6 (6-14)    


 


Blood Urea Nitrogen


  14 mg/dL


(8-26) 


  


  


 


 


Creatinine


  1.0 mg/dL


(0.7-1.3) 


  


  


 


 


Estimated GFR


(Cockcroft-Gault) 74.8 


  


  


  


 


 


Glucose Level


  142 mg/dL


(70-99) 


  


  


 


 


Calcium Level


  10.1 mg/dL


(8.5-10.1) 


  


  


 


 


Magnesium Level


  1.9 mg/dL


(1.8-2.4) 


  


  


 


 


Total Bilirubin


  0.5 mg/dL


(0.2-1.0) 


  


  


 


 


Direct Bilirubin


  < 0.1 mg/dL


(0.0-0.2) 


  


  


 


 


Aspartate Amino Transf


(AST/SGOT) 33 U/L (15-37) 


  


  


  


 


 


Alanine Aminotransferase


(ALT/SGPT) 33 U/L (16-63) 


  


  


  


 


 


Alkaline Phosphatase


  79 U/L


() 


  


  


 


 


Creatine Kinase


  511 U/L


() 


  


  


 


 


Creatine Kinase MB (Mass)


  7.6 ng/mL


(0.0-3.6) 


  


  


 


 


Creatine Kinase MB Relative


Index 1.5 % (0-4) 


  


  


  


 


 


Troponin I Quantitative


  < 0.017 ng/mL


(0.000-0.055) 


  < 0.017 ng/mL


(0.000-0.055) < 0.017 ng/mL


(0.000-0.055)


 


NT-Pro-B-Type Natriuretic


Peptide 119 pg/mL


(0-124) 


  


  


 


 


Total Protein


  7.5 g/dL


(6.4-8.2) 


  


  


 


 


Albumin


  4.2 g/dL


(3.4-5.0) 


  


  


 


 


Lipase


  140 U/L


() 


  


  


 


 


Thyroid Stimulating Hormone


(TSH) 4.216 uIU/mL


(0.358-3.74) 


  


  


 


 


Urine Collection Type  Unknown   


 


Urine Color  Yellow   


 


Urine Clarity  Clear   


 


Urine pH  7.0   


 


Urine Specific Gravity  1.010   


 


Urine Protein


  


  Negative mg/dL


(NEG-TRACE) 


  


 


 


Urine Glucose (UA)


  


  Negative mg/dL


(NEG) 


  


 


 


Urine Ketones (Stick)


  


  Negative mg/dL


(NEG) 


  


 


 


Urine Blood  Negative (NEG)   


 


Urine Nitrite  Negative (NEG)   


 


Urine Bilirubin  Negative (NEG)   


 


Urine Urobilinogen Dipstick


  


  0.2 mg/dL (0.2


mg/dL) 


  


 


 


Urine Leukocyte Esterase  Negative (NEG)   


 


Urine RBC  Occ /HPF (0-2)   


 


Urine WBC  Occ /HPF (0-4)   


 


Urine Squamous Epithelial


Cells 


  Few /LPF 


  


  


 


 


Urine Amorphous Sediment  Present /HPF   


 


Urine Bacteria  0 /HPF (0-FEW)   


 


Urine Opiates Screen  Neg (NEG)   


 


Urine Methadone Screen  Neg (NEG)   


 


Urine Barbiturates  Neg (NEG)   


 


Urine Phencyclidine Screen  Neg (NEG)   


 


Urine


Amphetamine/Methamphetamine 


  Neg (NEG) 


  


  


 


 


Urine Benzodiazepines Screen  Neg (NEG)   


 


Urine Cocaine Screen  Neg (NEG)   


 


Urine Cannabinoids Screen  Neg (NEG)   


 


Urine Ethyl Alcohol  Neg (NEG)   


 


Test


  9/10/17


07:31 


  


  


 


 


Glucose (Fingerstick)


  121 mg/dL


(70-99) 


  


  


 








Laboratory Tests








Test


  9/9/17


23:48 9/10/17


02:45 9/10/17


03:56 9/10/17


05:35


 


White Blood Count


  17.6 x10^3/uL


(4.0-11.0) 


  


  


 


 


Red Blood Count


  5.73 x10^6/uL


(4.30-5.70) 


  


  


 


 


Hemoglobin


  17.2 g/dL


(13.0-17.5) 


  


  


 


 


Hematocrit


  51.7 %


(39.0-53.0) 


  


  


 


 


Mean Corpuscular Volume 90 fL ()    


 


Mean Corpuscular Hemoglobin 30 pg (25-35)    


 


Mean Corpuscular Hemoglobin


Concent 33 g/dL


(31-37) 


  


  


 


 


Red Cell Distribution Width


  13.9 %


(11.5-14.5) 


  


  


 


 


Platelet Count


  187 x10^3/uL


(140-400) 


  


  


 


 


Neutrophils (%) (Auto) 85 % (31-73)    


 


Lymphocytes (%) (Auto) 7 % (24-48)    


 


Monocytes (%) (Auto) 7 % (0-9)    


 


Eosinophils (%) (Auto) 1 % (0-3)    


 


Basophils (%) (Auto) 0 % (0-3)    


 


Neutrophils # (Auto)


  14.9 x10^3uL


(1.8-7.7) 


  


  


 


 


Lymphocytes # (Auto)


  1.2 x10^3/uL


(1.0-4.8) 


  


  


 


 


Monocytes # (Auto)


  1.3 x10^3/uL


(0.0-1.1) 


  


  


 


 


Eosinophils # (Auto)


  0.2 x10^3/uL


(0.0-0.7) 


  


  


 


 


Basophils # (Auto)


  0.1 x10^3/uL


(0.0-0.2) 


  


  


 


 


Prothrombin Time


  13.9 SEC


(11.7-14.0) 


  


  


 


 


Prothromb Time International


Ratio 1.1 (0.8-1.1) 


  


  


  


 


 


Sodium Level


  139 mmol/L


(136-145) 


  


  


 


 


Potassium Level


  4.0 mmol/L


(3.5-5.1) 


  


  


 


 


Chloride Level


  97 mmol/L


() 


  


  


 


 


Carbon Dioxide Level


  36 mmol/L


(21-32) 


  


  


 


 


Anion Gap 6 (6-14)    


 


Blood Urea Nitrogen


  14 mg/dL


(8-26) 


  


  


 


 


Creatinine


  1.0 mg/dL


(0.7-1.3) 


  


  


 


 


Estimated GFR


(Cockcroft-Gault) 74.8 


  


  


  


 


 


Glucose Level


  142 mg/dL


(70-99) 


  


  


 


 


Calcium Level


  10.1 mg/dL


(8.5-10.1) 


  


  


 


 


Magnesium Level


  1.9 mg/dL


(1.8-2.4) 


  


  


 


 


Total Bilirubin


  0.5 mg/dL


(0.2-1.0) 


  


  


 


 


Direct Bilirubin


  < 0.1 mg/dL


(0.0-0.2) 


  


  


 


 


Aspartate Amino Transf


(AST/SGOT) 33 U/L (15-37) 


  


  


  


 


 


Alanine Aminotransferase


(ALT/SGPT) 33 U/L (16-63) 


  


  


  


 


 


Alkaline Phosphatase


  79 U/L


() 


  


  


 


 


Creatine Kinase


  511 U/L


() 


  


  


 


 


Creatine Kinase MB (Mass)


  7.6 ng/mL


(0.0-3.6) 


  


  


 


 


Creatine Kinase MB Relative


Index 1.5 % (0-4) 


  


  


  


 


 


Troponin I Quantitative


  < 0.017 ng/mL


(0.000-0.055) 


  < 0.017 ng/mL


(0.000-0.055) < 0.017 ng/mL


(0.000-0.055)


 


NT-Pro-B-Type Natriuretic


Peptide 119 pg/mL


(0-124) 


  


  


 


 


Total Protein


  7.5 g/dL


(6.4-8.2) 


  


  


 


 


Albumin


  4.2 g/dL


(3.4-5.0) 


  


  


 


 


Lipase


  140 U/L


() 


  


  


 


 


Thyroid Stimulating Hormone


(TSH) 4.216 uIU/mL


(0.358-3.74) 


  


  


 


 


Urine Collection Type  Unknown   


 


Urine Color  Yellow   


 


Urine Clarity  Clear   


 


Urine pH  7.0   


 


Urine Specific Gravity  1.010   


 


Urine Protein


  


  Negative mg/dL


(NEG-TRACE) 


  


 


 


Urine Glucose (UA)


  


  Negative mg/dL


(NEG) 


  


 


 


Urine Ketones (Stick)


  


  Negative mg/dL


(NEG) 


  


 


 


Urine Blood  Negative (NEG)   


 


Urine Nitrite  Negative (NEG)   


 


Urine Bilirubin  Negative (NEG)   


 


Urine Urobilinogen Dipstick


  


  0.2 mg/dL (0.2


mg/dL) 


  


 


 


Urine Leukocyte Esterase  Negative (NEG)   


 


Urine RBC  Occ /HPF (0-2)   


 


Urine WBC  Occ /HPF (0-4)   


 


Urine Squamous Epithelial


Cells 


  Few /LPF 


  


  


 


 


Urine Amorphous Sediment  Present /HPF   


 


Urine Bacteria  0 /HPF (0-FEW)   


 


Urine Opiates Screen  Neg (NEG)   


 


Urine Methadone Screen  Neg (NEG)   


 


Urine Barbiturates  Neg (NEG)   


 


Urine Phencyclidine Screen  Neg (NEG)   


 


Urine


Amphetamine/Methamphetamine 


  Neg (NEG) 


  


  


 


 


Urine Benzodiazepines Screen  Neg (NEG)   


 


Urine Cocaine Screen  Neg (NEG)   


 


Urine Cannabinoids Screen  Neg (NEG)   


 


Urine Ethyl Alcohol  Neg (NEG)   


 


Test


  9/10/17


07:31 


  


  


 


 


Glucose (Fingerstick)


  121 mg/dL


(70-99) 


  


  


 











Assessment/Plan


Assessment/Plan


s/p umbilical hernia repair


distention, vomiting, hiccups


sbo vs ileus


dehydration





will place NG for decompression, hydrate


repeat abdominal films in AM





TERRY VALLE MD 9/10/17 1359:


CONSULT


Allergies


Allergies:  


Coded Allergies:  


     iodine (Verified  Allergy, Severe, Anaphylaxis, 9/8/17)


     metformin (Verified  Allergy, Intermediate, 9/8/17)


 "FOOT CRAMPS"


     morphine (Verified  Allergy, Intermediate, Nausea and Vomiting, 9/8/17)


     naproxen sodium (Verified  Allergy, Intermediate, Rash, 9/8/17)


     simvastatin (Verified  Allergy, Intermediate, 9/8/17)


 MUSCLE CRAMPING





Assessment/Plan


Assessment/Plan


pt seen, interviewed and examined


will advance NG a little


follow exam


agree with above





Thanks for consult











ASHU BAJWA Sep 10, 2017 08:33


TERRY VALLE MD Sep 10, 2017 13:59

## 2017-09-10 NOTE — PDOC
GENERAL


General:


see dictated H&P. will start standard sliding scale insulin for diabetes as 

unable to give meds with ng tube.  will also start a catapress patch for hpt 

while npo. surgery consulted.


Problems:  





VITAL SIGNS


Vital Signs:





Vital Signs








  Date Time  Temp Pulse Resp B/P (MAP) Pulse Ox O2 Delivery O2 Flow Rate FiO2


 


9/10/17 07:20      Nasal Cannula 2.0 


 


9/10/17 07:00 98.8 75 18 133/82 (99) 93   





 98.8       











ALLERGIES


Allergies:





Allergies








Coded Allergies Type Severity Reaction Last Updated Verified


 


  iodine Allergy Severe Anaphylaxis 9/8/17 Yes


 


  metformin Allergy Intermediate  9/8/17 Yes


 


  morphine Allergy Intermediate Nausea and Vomiting 9/8/17 Yes


 


  naproxen sodium Allergy Intermediate Rash 9/8/17 Yes


 


  simvastatin Allergy Intermediate  9/8/17 Yes











MEDS


Medications:





Current Medications








 Medications


  (Trade)  Dose


 Ordered  Sig/Luci  Start Time


 Stop Time Status Last Admin


Dose Admin


 


 Dextrose/Sodium


 Chloride  1,000 ml @ 


 100 mls/hr  1X  ONCE  9/10/17 05:30


 9/10/17 15:29  9/10/17 05:30


100 MLS/HR


 


 Fentanyl Citrate


  (Fentanyl 2ml


 Vial)  50 mcg  PRN Q2HR  PRN  9/10/17 05:30


 9/11/17 05:29   


 


 


 Ondansetron HCl


  (Zofran)  4 mg  PRN Q8HRS  PRN  9/10/17 05:30


 9/11/17 05:29   


 


 


 Sodium Chloride  500 ml @ 


 500 mls/hr  1X  ONCE  9/10/17 08:45


 9/10/17 09:44  9/10/17 09:37


500 MLS/HR











LAB


Lab:





Laboratory Tests








Test


  9/9/17


23:48 9/10/17


02:45 9/10/17


03:56 9/10/17


05:35


 


White Blood Count


  17.6 x10^3/uL


(4.0-11.0) 


  


  


 


 


Red Blood Count


  5.73 x10^6/uL


(4.30-5.70) 


  


  


 


 


Hemoglobin


  17.2 g/dL


(13.0-17.5) 


  


  


 


 


Hematocrit


  51.7 %


(39.0-53.0) 


  


  


 


 


Mean Corpuscular Volume 90 fL ()    


 


Mean Corpuscular Hemoglobin 30 pg (25-35)    


 


Mean Corpuscular Hemoglobin


Concent 33 g/dL


(31-37) 


  


  


 


 


Red Cell Distribution Width


  13.9 %


(11.5-14.5) 


  


  


 


 


Platelet Count


  187 x10^3/uL


(140-400) 


  


  


 


 


Neutrophils (%) (Auto) 85 % (31-73)    


 


Lymphocytes (%) (Auto) 7 % (24-48)    


 


Monocytes (%) (Auto) 7 % (0-9)    


 


Eosinophils (%) (Auto) 1 % (0-3)    


 


Basophils (%) (Auto) 0 % (0-3)    


 


Neutrophils # (Auto)


  14.9 x10^3uL


(1.8-7.7) 


  


  


 


 


Lymphocytes # (Auto)


  1.2 x10^3/uL


(1.0-4.8) 


  


  


 


 


Monocytes # (Auto)


  1.3 x10^3/uL


(0.0-1.1) 


  


  


 


 


Eosinophils # (Auto)


  0.2 x10^3/uL


(0.0-0.7) 


  


  


 


 


Basophils # (Auto)


  0.1 x10^3/uL


(0.0-0.2) 


  


  


 


 


Prothrombin Time


  13.9 SEC


(11.7-14.0) 


  


  


 


 


Prothromb Time International


Ratio 1.1 (0.8-1.1) 


  


  


  


 


 


Sodium Level


  139 mmol/L


(136-145) 


  


  


 


 


Potassium Level


  4.0 mmol/L


(3.5-5.1) 


  


  


 


 


Chloride Level


  97 mmol/L


() 


  


  


 


 


Carbon Dioxide Level


  36 mmol/L


(21-32) 


  


  


 


 


Anion Gap 6 (6-14)    


 


Blood Urea Nitrogen


  14 mg/dL


(8-26) 


  


  


 


 


Creatinine


  1.0 mg/dL


(0.7-1.3) 


  


  


 


 


Estimated GFR


(Cockcroft-Gault) 74.8 


  


  


  


 


 


Glucose Level


  142 mg/dL


(70-99) 


  


  


 


 


Calcium Level


  10.1 mg/dL


(8.5-10.1) 


  


  


 


 


Magnesium Level


  1.9 mg/dL


(1.8-2.4) 


  


  


 


 


Total Bilirubin


  0.5 mg/dL


(0.2-1.0) 


  


  


 


 


Direct Bilirubin


  < 0.1 mg/dL


(0.0-0.2) 


  


  


 


 


Aspartate Amino Transf


(AST/SGOT) 33 U/L (15-37) 


  


  


  


 


 


Alanine Aminotransferase


(ALT/SGPT) 33 U/L (16-63) 


  


  


  


 


 


Alkaline Phosphatase


  79 U/L


() 


  


  


 


 


Creatine Kinase


  511 U/L


() 


  


  


 


 


Creatine Kinase MB (Mass)


  7.6 ng/mL


(0.0-3.6) 


  


  


 


 


Creatine Kinase MB Relative


Index 1.5 % (0-4) 


  


  


  


 


 


Troponin I Quantitative


  < 0.017 ng/mL


(0.000-0.055) 


  < 0.017 ng/mL


(0.000-0.055) < 0.017 ng/mL


(0.000-0.055)


 


NT-Pro-B-Type Natriuretic


Peptide 119 pg/mL


(0-124) 


  


  


 


 


Total Protein


  7.5 g/dL


(6.4-8.2) 


  


  


 


 


Albumin


  4.2 g/dL


(3.4-5.0) 


  


  


 


 


Lipase


  140 U/L


() 


  


  


 


 


Thyroid Stimulating Hormone


(TSH) 4.216 uIU/mL


(0.358-3.74) 


  


  


 


 


Urine Collection Type  Unknown   


 


Urine Color  Yellow   


 


Urine Clarity  Clear   


 


Urine pH  7.0   


 


Urine Specific Gravity  1.010   


 


Urine Protein


  


  Negative mg/dL


(NEG-TRACE) 


  


 


 


Urine Glucose (UA)


  


  Negative mg/dL


(NEG) 


  


 


 


Urine Ketones (Stick)


  


  Negative mg/dL


(NEG) 


  


 


 


Urine Blood  Negative (NEG)   


 


Urine Nitrite  Negative (NEG)   


 


Urine Bilirubin  Negative (NEG)   


 


Urine Urobilinogen Dipstick


  


  0.2 mg/dL (0.2


mg/dL) 


  


 


 


Urine Leukocyte Esterase  Negative (NEG)   


 


Urine RBC  Occ /HPF (0-2)   


 


Urine WBC  Occ /HPF (0-4)   


 


Urine Squamous Epithelial


Cells 


  Few /LPF 


  


  


 


 


Urine Amorphous Sediment  Present /HPF   


 


Urine Bacteria  0 /HPF (0-FEW)   


 


Urine Opiates Screen  Neg (NEG)   


 


Urine Methadone Screen  Neg (NEG)   


 


Urine Barbiturates  Neg (NEG)   


 


Urine Phencyclidine Screen  Neg (NEG)   


 


Urine


Amphetamine/Methamphetamine 


  Neg (NEG) 


  


  


 


 


Urine Benzodiazepines Screen  Neg (NEG)   


 


Urine Cocaine Screen  Neg (NEG)   


 


Urine Cannabinoids Screen  Neg (NEG)   


 


Urine Ethyl Alcohol  Neg (NEG)   


 


Test


  9/10/17


07:31 9/10/17


08:00 


  


 


 


Glucose (Fingerstick)


  121 mg/dL


(70-99) 


  


  


 


 


White Blood Count


  


  12.0 x10^3/uL


(4.0-11.0) 


  


 


 


Red Blood Count


  


  5.21 x10^6/uL


(4.30-5.70) 


  


 


 


Hemoglobin


  


  15.6 g/dL


(13.0-17.5) 


  


 


 


Hematocrit


  


  47.3 %


(39.0-53.0) 


  


 


 


Mean Corpuscular Volume  91 fL ()   


 


Mean Corpuscular Hemoglobin  30 pg (25-35)   


 


Mean Corpuscular Hemoglobin


Concent 


  33 g/dL


(31-37) 


  


 


 


Red Cell Distribution Width


  


  14.3 %


(11.5-14.5) 


  


 


 


Platelet Count


  


  171 x10^3/uL


(140-400) 


  


 


 


Neutrophils (%) (Auto)  81 % (31-73)   


 


Lymphocytes (%) (Auto)  9 % (24-48)   


 


Monocytes (%) (Auto)  8 % (0-9)   


 


Eosinophils (%) (Auto)  2 % (0-3)   


 


Basophils (%) (Auto)  0 % (0-3)   


 


Neutrophils # (Auto)


  


  9.7 x10^3uL


(1.8-7.7) 


  


 


 


Lymphocytes # (Auto)


  


  1.1 x10^3/uL


(1.0-4.8) 


  


 


 


Monocytes # (Auto)


  


  0.9 x10^3/uL


(0.0-1.1) 


  


 


 


Eosinophils # (Auto)


  


  0.2 x10^3/uL


(0.0-0.7) 


  


 


 


Basophils # (Auto)


  


  0.0 x10^3/uL


(0.0-0.2) 


  


 

















STEPHANIE LANGLEY MD Sep 10, 2017 09:56

## 2017-09-10 NOTE — EKG
[facility]

               [address] [place] [address]

Test Date:    2017               Test Time:    23:46:35

Pat Name:     JUVE QUINTERO              Department:   

Patient ID:   [id number]           Room:         422 

Gender:       M                        Technician:   

:          1950               Requested By: JAYANT WHEAT

Order Number: [id number]            Reading MD:   Sariah Lassiter

                                 Measurements

Intervals                              Axis          

Rate:         57                       P:            39

CT:           156                      QRS:          72

QRSD:         82                       T:            59

QT:           404                                    

QTc:          396                                    

                           Interpretive Statements

SINUS RHYTHM

NORMAL EKG

Electronically Signed On 2017 9:53:48 CDT by Sariah Lassiter

## 2017-09-10 NOTE — RAD
Portable chest, 9/9/2017:



History: Syncope



Comparison is made to a study from 9/12/2015. The heart size and pulmonary

vascularity are normal. There is bibasilar linear atelectasis or scarring. No

pulmonary consolidation is seen. There is no evidence of pleural fluid.





IMPRESSION: Minimal bibasilar linear atelectasis and/or scarring.

## 2017-09-11 VITALS — DIASTOLIC BLOOD PRESSURE: 84 MMHG | SYSTOLIC BLOOD PRESSURE: 132 MMHG

## 2017-09-11 VITALS — SYSTOLIC BLOOD PRESSURE: 140 MMHG | DIASTOLIC BLOOD PRESSURE: 91 MMHG

## 2017-09-11 VITALS — SYSTOLIC BLOOD PRESSURE: 159 MMHG | DIASTOLIC BLOOD PRESSURE: 93 MMHG

## 2017-09-11 VITALS — DIASTOLIC BLOOD PRESSURE: 75 MMHG | SYSTOLIC BLOOD PRESSURE: 126 MMHG

## 2017-09-11 VITALS — DIASTOLIC BLOOD PRESSURE: 81 MMHG | SYSTOLIC BLOOD PRESSURE: 124 MMHG

## 2017-09-11 VITALS — SYSTOLIC BLOOD PRESSURE: 124 MMHG | DIASTOLIC BLOOD PRESSURE: 84 MMHG

## 2017-09-11 LAB
ANION GAP SERPL CALC-SCNC: 7 MMOL/L (ref 6–14)
BASOPHILS # BLD AUTO: 0 X10^3/UL (ref 0–0.2)
BASOPHILS NFR BLD: 0 % (ref 0–3)
BUN SERPL-MCNC: 12 MG/DL (ref 8–26)
CALCIUM SERPL-MCNC: 8.3 MG/DL (ref 8.5–10.1)
CHLORIDE SERPL-SCNC: 100 MMOL/L (ref 98–107)
CO2 SERPL-SCNC: 29 MMOL/L (ref 21–32)
CREAT SERPL-MCNC: 0.9 MG/DL (ref 0.7–1.3)
EOSINOPHIL NFR BLD: 1 % (ref 0–3)
ERYTHROCYTE [DISTWIDTH] IN BLOOD BY AUTOMATED COUNT: 14 % (ref 11.5–14.5)
ERYTHROCYTE [DISTWIDTH] IN BLOOD BY AUTOMATED COUNT: 14 % (ref 11.5–14.5)
GFR SERPLBLD BASED ON 1.73 SQ M-ARVRAT: 84.4 ML/MIN
GLUCOSE SERPL-MCNC: 113 MG/DL (ref 70–99)
HCT VFR BLD CALC: 43.7 % (ref 39–53)
HCT VFR BLD CALC: 44.6 % (ref 39–53)
HGB BLD-MCNC: 15 G/DL (ref 13–17.5)
HGB BLD-MCNC: 15.4 G/DL (ref 13–17.5)
LYMPHOCYTES # BLD: 0.7 X10^3/UL (ref 1–4.8)
LYMPHOCYTES NFR BLD AUTO: 6 % (ref 24–48)
MCH RBC QN AUTO: 30 PG (ref 25–35)
MCH RBC QN AUTO: 30 PG (ref 25–35)
MCHC RBC AUTO-ENTMCNC: 34 G/DL (ref 31–37)
MCHC RBC AUTO-ENTMCNC: 35 G/DL (ref 31–37)
MCV RBC AUTO: 88 FL (ref 79–100)
MCV RBC AUTO: 88 FL (ref 79–100)
MONOCYTES NFR BLD: 9 % (ref 0–9)
NEUTROPHILS NFR BLD AUTO: 83 % (ref 31–73)
PLATELET # BLD AUTO: 160 X10^3/UL (ref 140–400)
PLATELET # BLD AUTO: 169 X10^3/UL (ref 140–400)
POTASSIUM SERPL-SCNC: 3.5 MMOL/L (ref 3.5–5.1)
RBC # BLD AUTO: 4.96 X10^6/UL (ref 4.3–5.7)
RBC # BLD AUTO: 5.08 X10^6/UL (ref 4.3–5.7)
SODIUM SERPL-SCNC: 136 MMOL/L (ref 136–145)
WBC # BLD AUTO: 12.2 X10^3/UL (ref 4–11)
WBC # BLD AUTO: 12.6 X10^3/UL (ref 4–11)

## 2017-09-11 RX ADMIN — INSULIN ASPART SCH UNITS: 100 INJECTION, SOLUTION INTRAVENOUS; SUBCUTANEOUS at 08:00

## 2017-09-11 RX ADMIN — INSULIN ASPART SCH UNITS: 100 INJECTION, SOLUTION INTRAVENOUS; SUBCUTANEOUS at 17:00

## 2017-09-11 RX ADMIN — INSULIN ASPART SCH UNITS: 100 INJECTION, SOLUTION INTRAVENOUS; SUBCUTANEOUS at 12:00

## 2017-09-11 RX ADMIN — BACITRACIN SCH MLS/HR: 5000 INJECTION, POWDER, FOR SOLUTION INTRAMUSCULAR at 12:00

## 2017-09-11 RX ADMIN — HYDROMORPHONE HYDROCHLORIDE PRN MG: 2 INJECTION INTRAMUSCULAR; INTRAVENOUS; SUBCUTANEOUS at 21:43

## 2017-09-11 RX ADMIN — ENOXAPARIN SODIUM SCH MG: 40 INJECTION SUBCUTANEOUS at 21:00

## 2017-09-11 RX ADMIN — BACITRACIN SCH MLS/HR: 5000 INJECTION, POWDER, FOR SOLUTION INTRAMUSCULAR at 04:45

## 2017-09-11 RX ADMIN — HYDROMORPHONE HYDROCHLORIDE PRN MG: 2 INJECTION INTRAMUSCULAR; INTRAVENOUS; SUBCUTANEOUS at 17:12

## 2017-09-11 RX ADMIN — HYDROMORPHONE HYDROCHLORIDE PRN MG: 2 INJECTION INTRAMUSCULAR; INTRAVENOUS; SUBCUTANEOUS at 13:06

## 2017-09-11 NOTE — PDOC
Provider Note


Provider Note


+flatus.  his wife says his abdomen has been red around the incision since 

surgery and the nurses removed the bandage thinking he may be reacting to it.  

she thinks the redness is better.  no abd pain except at inc as expected


afeb


vss


wbc stable at ~12


does not appear ill, toxic


abd soft nd nt except mild at inc as expected


inc covered by dry steristrips.  erythema around the inc and lower abdomen--

nontender, not indurated


a/p


erythema,  poss reaction to bandage or prep used intra op.  wife says this 

started the day of surgery so this explanation is most likely.  infection less 

likely given timing, but given the persistence of the erythema, i have 

empirically started ancef.  


added lovenox for dvt proph


dc ng (xrays with air throughout colon, nonobstructive).  start clears.











RONI GRIFFIN MD Sep 11, 2017 10:56

## 2017-09-11 NOTE — PDOC
Provider Note


Provider Note


vss, some flatus- labs ok- will not use januvia at home re lower a1c 6.3, cost











YANET HERNDON MD Sep 11, 2017 09:26

## 2017-09-11 NOTE — RAD
Acute abdomen series, 9/11/2017:



History: Small bowel obstruction



Comparison is made to yesterday study. An NG tube remains in place extending

into the proximal aspect of the stomach. There is a moderate amount of gas and

stool scattered throughout the colon. No significant small bowel distention is

evident. No free air is seen in the abdomen. There is no evidence of

organomegaly.



The heart is within normal limits in size. There is minimal linear atelectasis

or scarring in the left base. No pulmonary consolidation is seen. There is no

evidence of pleural fluid.



IMPRESSION:

1. The NG tube extends into the proximal aspect of the stomach.

2. No acute abdominal abnormality is detected.

## 2017-09-12 VITALS — SYSTOLIC BLOOD PRESSURE: 119 MMHG | DIASTOLIC BLOOD PRESSURE: 77 MMHG

## 2017-09-12 VITALS — DIASTOLIC BLOOD PRESSURE: 73 MMHG | SYSTOLIC BLOOD PRESSURE: 108 MMHG

## 2017-09-12 VITALS — SYSTOLIC BLOOD PRESSURE: 128 MMHG | DIASTOLIC BLOOD PRESSURE: 84 MMHG

## 2017-09-12 VITALS — SYSTOLIC BLOOD PRESSURE: 119 MMHG | DIASTOLIC BLOOD PRESSURE: 66 MMHG

## 2017-09-12 VITALS — SYSTOLIC BLOOD PRESSURE: 135 MMHG | DIASTOLIC BLOOD PRESSURE: 75 MMHG

## 2017-09-12 VITALS — DIASTOLIC BLOOD PRESSURE: 68 MMHG | SYSTOLIC BLOOD PRESSURE: 128 MMHG

## 2017-09-12 RX ADMIN — HYDROMORPHONE HYDROCHLORIDE PRN MG: 2 INJECTION INTRAMUSCULAR; INTRAVENOUS; SUBCUTANEOUS at 00:55

## 2017-09-12 RX ADMIN — ENOXAPARIN SODIUM SCH MG: 40 INJECTION SUBCUTANEOUS at 21:00

## 2017-09-12 RX ADMIN — BACITRACIN SCH MLS/HR: 5000 INJECTION, POWDER, FOR SOLUTION INTRAMUSCULAR at 22:33

## 2017-09-12 RX ADMIN — INSULIN ASPART SCH UNITS: 100 INJECTION, SOLUTION INTRAVENOUS; SUBCUTANEOUS at 12:00

## 2017-09-12 RX ADMIN — BACITRACIN SCH MLS/HR: 5000 INJECTION, POWDER, FOR SOLUTION INTRAMUSCULAR at 00:54

## 2017-09-12 RX ADMIN — HYDROMORPHONE HYDROCHLORIDE PRN MG: 2 INJECTION INTRAMUSCULAR; INTRAVENOUS; SUBCUTANEOUS at 09:52

## 2017-09-12 RX ADMIN — INSULIN ASPART SCH UNITS: 100 INJECTION, SOLUTION INTRAVENOUS; SUBCUTANEOUS at 08:00

## 2017-09-12 RX ADMIN — BACITRACIN SCH MLS/HR: 5000 INJECTION, POWDER, FOR SOLUTION INTRAMUSCULAR at 12:00

## 2017-09-12 RX ADMIN — INSULIN ASPART SCH UNITS: 100 INJECTION, SOLUTION INTRAVENOUS; SUBCUTANEOUS at 16:58

## 2017-09-12 NOTE — PDOC
Provider Note


Provider Note


labs ok, bp good ,daniel cl so far- cont same, diet per surg











YANET HERNDON MD Sep 12, 2017 08:41

## 2017-09-12 NOTE — PDOC
Provider Note


Provider Note


I saw and examined him.


hiccups persist.  no belching.  no nausea.  doesn't feel distended.  +flatus.  

daniel clears


afeb


vss


no distress


abd soft nd nt no tympany


inc covered by dry steristrips.


erythema much improved


a/p


ileus, improving.


erythema abd skin around incision, most likely rxn to adhesive or prep, based 

on timing this is less likely infection





full liquids.











RONI GRIFFIN MD Sep 12, 2017 10:22

## 2017-09-13 VITALS
SYSTOLIC BLOOD PRESSURE: 130 MMHG | SYSTOLIC BLOOD PRESSURE: 130 MMHG | SYSTOLIC BLOOD PRESSURE: 130 MMHG | DIASTOLIC BLOOD PRESSURE: 83 MMHG | DIASTOLIC BLOOD PRESSURE: 83 MMHG | DIASTOLIC BLOOD PRESSURE: 83 MMHG | DIASTOLIC BLOOD PRESSURE: 83 MMHG | SYSTOLIC BLOOD PRESSURE: 130 MMHG

## 2017-09-13 VITALS — SYSTOLIC BLOOD PRESSURE: 137 MMHG | DIASTOLIC BLOOD PRESSURE: 84 MMHG

## 2017-09-13 VITALS — DIASTOLIC BLOOD PRESSURE: 80 MMHG | SYSTOLIC BLOOD PRESSURE: 137 MMHG

## 2017-09-13 LAB
ANION GAP SERPL CALC-SCNC: 7 MMOL/L (ref 6–14)
BUN SERPL-MCNC: 12 MG/DL (ref 8–26)
CALCIUM SERPL-MCNC: 8.4 MG/DL (ref 8.5–10.1)
CHLORIDE SERPL-SCNC: 103 MMOL/L (ref 98–107)
CO2 SERPL-SCNC: 29 MMOL/L (ref 21–32)
CREAT SERPL-MCNC: 0.8 MG/DL (ref 0.7–1.3)
ERYTHROCYTE [DISTWIDTH] IN BLOOD BY AUTOMATED COUNT: 13.4 % (ref 11.5–14.5)
GFR SERPLBLD BASED ON 1.73 SQ M-ARVRAT: 96.7 ML/MIN
GLUCOSE SERPL-MCNC: 98 MG/DL (ref 70–99)
HCT VFR BLD CALC: 42.3 % (ref 39–53)
HGB BLD-MCNC: 14.4 G/DL (ref 13–17.5)
MCH RBC QN AUTO: 30 PG (ref 25–35)
MCHC RBC AUTO-ENTMCNC: 34 G/DL (ref 31–37)
MCV RBC AUTO: 88 FL (ref 79–100)
PLATELET # BLD AUTO: 173 X10^3/UL (ref 140–400)
POTASSIUM SERPL-SCNC: 3.6 MMOL/L (ref 3.5–5.1)
RBC # BLD AUTO: 4.8 X10^6/UL (ref 4.3–5.7)
SODIUM SERPL-SCNC: 139 MMOL/L (ref 136–145)
WBC # BLD AUTO: 9.9 X10^3/UL (ref 4–11)

## 2017-09-13 RX ADMIN — INSULIN ASPART SCH UNITS: 100 INJECTION, SOLUTION INTRAVENOUS; SUBCUTANEOUS at 07:52

## 2017-09-13 RX ADMIN — HYDROMORPHONE HYDROCHLORIDE PRN MG: 2 INJECTION INTRAMUSCULAR; INTRAVENOUS; SUBCUTANEOUS at 01:53

## 2017-09-13 RX ADMIN — BACITRACIN SCH MLS/HR: 5000 INJECTION, POWDER, FOR SOLUTION INTRAMUSCULAR at 06:45

## 2017-09-13 NOTE — ACF
Admission Forms Criteria


                         INTESTINAL OBSTRUCTION





Clinical Indications for Admission to Inpatient Care





                                                                               

          ([place]/check or initial the applicable condition/criteria)


                            





Admission is indicated for 1 or more of the following (1)(2)(3)(4)(5)(6)(7):


[X] I.    Partial bowel obstruction 


II.   Complete bowel obstruction











Extended stay beyond goal length of stay may be needed for(3)(25):


[ ]a)    Identified etiology (eg, hernia, volvulus, cancer with obstruction) 

requiring intervention 


[ ]b)    Gallstone ileus (26)


[ ]c)    Surgical intervention (3)(4)(5)(7)(27)(28)(29)(30): 


[ ]d)    Acute comorbid illness (eg, electrolyte imbalance, hypovolemia, renal 

failure)











The original [employer] content created by [employer] has been revised. 


The portions of the content which have been revised are identified through the 

use of italic text or in bold, and [facility] 


has neither reviewed nor approved the modified material. All other unmodified 

content is copyright  [employer].





Please see references footnoted in the original [employer] edition 

2017


Admission Criteria Met?:  Yes











SARA UMANZOR Sep 13, 2017 01:43

## 2017-09-13 NOTE — PDOC
Provider Note


Provider Note


no abd pain. +bm.  hiccups better.  no n/v.  wants to go home.  ambulating 

independently in the halls. expresses gratitude for the care i have given him





his RUE arm had an IV that he says 'went bad' last night.  the iv was removed.  

the swelling and redness on his arm has improved per his report.  the arm is 

sore.





afeb


vss


wbc normal


rue with mild erythema and tenderness and induration.  no fluctuance no pus at 

iv site.  no skin necrosis


abd soft nd nt


the inc is healing as expected. min amt of dry necrosis at skin edges (i 

removed the steristrips)


erythema on abd wall resolved. 


a/p


soft diet.


dc home on oral clinda for poss abd skin cellulitis and poss rue cellulitis.  

instructed him to call if the arm worsens--see dc instructions for more info.











RONI GRIFFIN MD Sep 13, 2017 11:25

## 2017-09-13 NOTE — PDOC
Provider Note


Provider Note


vss, no temp, labs good- on fl now- dc iv fluid, dc when surg ok











YANET HERNDON MD Sep 13, 2017 08:33

## 2017-09-18 NOTE — HP
ADMIT DATE:  09/10/2017



DATE OF SERVICE:  09/10/2017



CHIEF COMPLAINT AND HISTORY OF PRESENT ILLNESS:  This is a 67-year-old male who

is a patient of Dr. Tan, who I am covering for on the date of the patient's

admission.  The patient presented to the Emergency Room with complaints of

nausea, vomiting and a syncopal episode.  The patient has a history of having

umbilical hernia surgery on 09/08/2017.  The patient states that he has not had

a bowel movement or flatulence since his surgery and has been having increased

abdominal distention since.  The patient presented to the ER on 09/07/2017 with

complaints of urinary retention.  The patient was straight cathed on that day

and has had no further complaints of urinary retention since.  In the Emergency

Room, a CT scan was performed, which revealed a bowel obstruction.  The patient

was admitted to the hospital for evaluation and treatment.



PAST MEDICAL HISTORY:  As mentioned above, the patient underwent hernia repair

on 09/08/2017.  The patient has a history of hypertension.



ALLERGIES:  IODINE, METFORMIN, MORPHINE, NAPROXEN, SODIUM, SIMVASTATIN.



FAMILY HISTORY:  Noncontributory.



REVIEW OF SYSTEMS:  As mentioned above.



PHYSICAL EXAMINATION:

GENERAL:  The patient is a well-developed and well-nourished male who is in no

apparent distress on the morning of my examination.

VITAL SIGNS:  Stable this morning with mildly elevated blood pressure at 141/79.

HEENT:  Unremarkable.

NECK:  Supple, without adenopathy.

CHEST:  Clear to auscultation bilaterally.  Respirations are even and unlabored.

HEART:  Regular rate and rhythm without S3, S4 or murmur.

ABDOMEN:  Distended, tender throughout without hepatosplenomegaly or mass

present.

EXTREMITIES:  Without cyanosis, clubbing or edema.

NEUROLOGIC:  Grossly intact.



IMPRESSION:  Bowel obstruction following umbilical hernia repair on 09/08/2017.



PLAN:  The patient has been admitted, Surgery team has been consulted.  An NG

tube has been placed for decompression.  The patient will be placed on Catapres

for his blood pressure and we will change his D5 to sliding scale NovoLog.  The

patient will remain on telemetry until the bowel obstruction is cleared.  We

will continue to monitor the patient closely throughout his hospital stay.

 



______________________________

STEPHANIE LANGLEY MD



DR:  BOZENA/rosa elena  JOB#:  [id number] / [id number]

DD:  09/18/2017 11:54  DT:  09/18/2017 12:31

## 2017-09-20 NOTE — DS
DATE OF DISCHARGE:  09/13/2017



DATE OF ADMISSION:  09/10/2017



DATE OF DISCHARGE:  09/13/2017



HOSPITAL SUMMARY:  A 67-year-old white male just a few days out from

laparoscopic hernia repair, came in with vomiting and evidence of some degree of

small-bowel obstruction by CT scan.  CBC showed leukocytosis that resolved and

chemistry studies were all within normal limits.  Urine drug screen was

negative.  Urinalysis was normal as well.  He responded to IV fluids and NG

suction for a few days and was able to advance his diet from liquids to some

food and was able to be discharged and followed as an outpatient.



FINAL DIAGNOSES:  Ileus, status post hernia repair.



OPERATIONS, PROCEDURES, COMPLICATIONS:  None.



CONSULTATIONS:  Dr. Velez's group.



DISPOSITION:  All meds remain the same.  Pain meds per the surgeons ____ and

advance diet as tolerated.  Office followup with their office in 1 week

regarding sutures and wound status.

 



______________________________

YANET HERNDON MD



DR:  TETO/rosa elena  JOB#:  [id number] / [id number]

DD:  09/20/2017 07:52  DT:  09/20/2017 08:59

## 2018-10-05 ENCOUNTER — HOSPITAL ENCOUNTER (OUTPATIENT)
Dept: HOSPITAL 61 - KCIC | Age: 68
Discharge: HOME | End: 2018-10-05
Attending: FAMILY MEDICINE
Payer: MEDICARE

## 2018-10-05 DIAGNOSIS — M50.323: Primary | ICD-10-CM

## 2018-10-05 DIAGNOSIS — M25.78: ICD-10-CM

## 2018-10-05 DIAGNOSIS — M48.02: ICD-10-CM

## 2018-10-05 DIAGNOSIS — M47.892: ICD-10-CM

## 2018-10-05 PROCEDURE — 72050 X-RAY EXAM NECK SPINE 4/5VWS: CPT

## 2018-10-05 NOTE — [facility]
CERVICAL SPINE 5V

 

History: Left cervical radiculopathy, neck pain, shoulder pain

 

Comparison: None.

 

Findings:

6 views of the cervical spine are submitted. There is multilevel cervical 

facet degenerative change. There is adequate alignment of lateral masses 

C1 relative to C2. Tip of dens is obscured the odontoid view. There is 

moderate to severe degenerative disc disease C5-C6 and to lesser degree 

C6-7 with spondylosis at the same levels. Cervical vertebral body stature 

is preserved. AP alignment is within normal limits. There is narrowing of 

the left C5-6 neural foramen by uncovertebral osteophytes. There is 

atherosclerotic calcification of the right carotid artery in the neck.

 

Impression: 

 

1.  There is degenerative disc disease and spondylosis greatest C5-6 and 

C6-7.

2. There is narrowing of the left C5-6 neural foramen by uncovertebral 

osteophytes. There is multilevel facet degenerative change.

 

Electronically signed by: De Sousa MD (10/5/2018 1:04 PM) 

[id number]

## 2018-10-12 ENCOUNTER — HOSPITAL ENCOUNTER (OUTPATIENT)
Dept: HOSPITAL 61 - KCIC MRI | Age: 68
Discharge: HOME | End: 2018-10-12
Attending: FAMILY MEDICINE
Payer: MEDICARE

## 2018-10-12 DIAGNOSIS — M50.123: Primary | ICD-10-CM

## 2018-10-12 DIAGNOSIS — M48.02: ICD-10-CM

## 2018-10-12 DIAGNOSIS — Z98.890: ICD-10-CM

## 2018-10-12 PROCEDURE — 72141 MRI NECK SPINE W/O DYE: CPT

## 2018-10-12 NOTE — [facility]
MRI Cervical Spine Without Contrast

 

History: Cervical radiculopathy, acute pain and pain and numbness in the 

left upper extremity for 6 weeks

 

Technique: Multiplanar, multi sequential noncontrast MR imaging was 

performed of the cervical spine.

 

Comparison: None

 

Findings: Cervical vertebral body stature is preserved. Cervical cord 

caliber is within normal limits without convincing focal signal 

abnormality, somewhat limited evaluation at sites of spinal stenosis such 

as C4-5 and C5-6. There is no significant abnormality of the cervical 

medullary junction. AP alignment is within normal limits. There is 

mild-to-moderate degenerative disc disease at C5-C6, minimally at C6-7, 

mild disc desiccation at other levels.

 

C2-C3:  Neural foramina and spinal canal are adequate.

 

C3-C4:  Central canal is borderline about 10 mm on developmental basis. 

Neural foramina are overall adequate. 

 

C4-C5:  There is extrusion extending above and below the intervertebral 

disc space more centrally with indentation upon the ventral thecal sac and

cord, extrusion on the order of about 1.4 cm CC by 0.5 cm AP by 1.2 cm 

transverse. Central canal is significantly narrowed to about 0.3 to 0.4 

cm. Neural foramina are adequate.

 

C5-C6:  There is a broad prominent posterior protrusion more eccentric to 

the left lateral recess, probably a very shallow within extruded component

extending slightly above the intervertebral disc space. There is 

effacement of ventral subarachnoid space and indentation upon the ventral 

cord greater in the left lateral recess, significant narrowing the central

canal on the order of 0.4 to 0.5 cm, greater degree of left lateral recess

stenosis. There is likely component of left uncovertebral degenerative 

change. There is severe narrowing of the left neural foramen greater 

proximally. Right neural foramen is overall adequate.

 

C6-C7:   There is disc osteophyte complex and bulge/protrusion. Central 

canal is narrowed to 6 to 7 mm. There is uncovertebral degenerative change

on the left. Right neural foramen is adequate. There is moderate to severe

narrowing of the left neural foramen.

 

C7-T1:  There is a very shallow posterior central protrusion. Central 

canal is adequate about 11 mm. Neural foramina are adequate.

 

Impression: 

1.  There is severe spinal stenosis C4-5 and C5-C6, large extrusion at 

C4-5 and prominent protrusion C5-C6. There is indentation upon the ventral

cord. There is a lesser degree of spinal stenosis C6-7 as described.

2. There is more significant narrowing of the left C5-6 and C6-7 neural 

foramina.

3. There is mild-to-moderate degenerative disc disease C5-6 and to lesser 

degree at C6-7.

 

Results were called to the nurse in Dr. Tan's office October 12, 2018 

10:14 AM.

 

Electronically signed by: De Sousa MD (10/12/2018 10:14 AM) 

[facility]

## 2018-11-29 ENCOUNTER — HOSPITAL ENCOUNTER (OUTPATIENT)
Dept: HOSPITAL 61 - SURGPAT | Age: 68
End: 2018-11-29
Attending: NEUROLOGICAL SURGERY
Payer: MEDICARE

## 2018-11-29 DIAGNOSIS — Z01.818: Primary | ICD-10-CM

## 2018-11-29 DIAGNOSIS — M54.12: ICD-10-CM

## 2018-11-29 DIAGNOSIS — M48.02: ICD-10-CM

## 2018-11-29 LAB
ALBUMIN SERPL-MCNC: 3.9 G/DL (ref 3.4–5)
ALBUMIN/GLOB SERPL: 1.3 {RATIO} (ref 1–1.7)
ALP SERPL-CCNC: 78 U/L (ref 46–116)
ALT SERPL-CCNC: 33 U/L (ref 16–63)
ANION GAP SERPL CALC-SCNC: 9 MMOL/L (ref 6–14)
AST SERPL-CCNC: 16 U/L (ref 15–37)
BASOPHILS # BLD AUTO: 0.1 X10^3/UL (ref 0–0.2)
BASOPHILS NFR BLD: 1 % (ref 0–3)
BILIRUB SERPL-MCNC: 0.3 MG/DL (ref 0.2–1)
BUN SERPL-MCNC: 15 MG/DL (ref 8–26)
BUN/CREAT SERPL: 17 (ref 6–20)
CALCIUM SERPL-MCNC: 9.2 MG/DL (ref 8.5–10.1)
CHLORIDE SERPL-SCNC: 103 MMOL/L (ref 98–107)
CO2 SERPL-SCNC: 26 MMOL/L (ref 21–32)
CREAT SERPL-MCNC: 0.9 MG/DL (ref 0.7–1.3)
EOSINOPHIL NFR BLD: 0.4 X10^3/UL (ref 0–0.7)
EOSINOPHIL NFR BLD: 5 % (ref 0–3)
ERYTHROCYTE [DISTWIDTH] IN BLOOD BY AUTOMATED COUNT: 13.8 % (ref 11.5–14.5)
GFR SERPLBLD BASED ON 1.73 SQ M-ARVRAT: 84.2 ML/MIN
GLOBULIN SER-MCNC: 3 G/DL (ref 2.2–3.8)
GLUCOSE SERPL-MCNC: 147 MG/DL (ref 70–99)
HCT VFR BLD CALC: 47.9 % (ref 39–53)
HGB BLD-MCNC: 16.7 G/DL (ref 13–17.5)
LYMPHOCYTES # BLD: 1.7 X10^3/UL (ref 1–4.8)
LYMPHOCYTES NFR BLD AUTO: 21 % (ref 24–48)
MCH RBC QN AUTO: 31 PG (ref 25–35)
MCHC RBC AUTO-ENTMCNC: 35 G/DL (ref 31–37)
MCV RBC AUTO: 89 FL (ref 79–100)
MONO #: 0.8 X10^3/UL (ref 0–1.1)
MONOCYTES NFR BLD: 9 % (ref 0–9)
NEUT #: 5.3 X10^3UL (ref 1.8–7.7)
NEUTROPHILS NFR BLD AUTO: 64 % (ref 31–73)
PLATELET # BLD AUTO: 192 X10^3/UL (ref 140–400)
POTASSIUM SERPL-SCNC: 3.9 MMOL/L (ref 3.5–5.1)
PROT SERPL-MCNC: 6.9 G/DL (ref 6.4–8.2)
RBC # BLD AUTO: 5.38 X10^6/UL (ref 4.3–5.7)
SODIUM SERPL-SCNC: 138 MMOL/L (ref 136–145)
WBC # BLD AUTO: 8.3 X10^3/UL (ref 4–11)

## 2018-11-29 PROCEDURE — 87641 MR-STAPH DNA AMP PROBE: CPT

## 2018-11-29 PROCEDURE — 93005 ELECTROCARDIOGRAM TRACING: CPT

## 2018-11-29 PROCEDURE — 85025 COMPLETE CBC W/AUTO DIFF WBC: CPT

## 2018-11-29 PROCEDURE — 80053 COMPREHEN METABOLIC PANEL: CPT

## 2018-11-29 PROCEDURE — 36415 COLL VENOUS BLD VENIPUNCTURE: CPT

## 2018-11-29 NOTE — EKG
[facility]

              [address] [place] [address]

Test Date:    2018               Test Time:    15:12:13

Pat Name:     JUVE QUINTERO              Department:   

Patient ID:   [id number]           Room:          

Gender:       M                        Technician:   TV

:          1950               Requested By: YUNIOR BROUSSARD

Order Number: [id number]           Reading MD:   Gomez Vincent MD

                                 Measurements

Intervals                              Axis          

Rate:         77                       P:            38

MT:           160                      QRS:          -17

QRSD:         86                       T:            52

QT:           368                                    

QTc:          418                                    

                           Interpretive Statements

SINUS RHYTHM



Electronically Signed On 12-3-2018 8:16:44 CST by Gomez Vincent MD

## 2018-12-05 NOTE — HP
ADMIT DATE:  12/06/2018



DATE OF SURGERY:  12/06/2018



HISTORY OF PRESENT ILLNESS:  The patient is a pleasant 68-year-old who has

difficulty with neck pain as well as left arm, forearm and hand numbness and

tingling.  He says in the last few days, he has noted tingling, which can

radiate into his left leg.  The problem started in August 2018 and was with

severe pain in his neck.  He rates his pain as a 9/10.  He says he notes a

constant numb and tingling sensation.  He says he is also noticing unsteadiness

with walking.



PAST MEDICAL HISTORY:  Headaches and migraines, head or neck injury,

hypertension.



PAST SURGICAL HISTORY:  Left shoulder repair 2000, right shoulder repair in

2002, knee scope and repair in 2014, hernia repair in 2016.



FAMILY HISTORY:  Diabetes.



SOCIAL HISTORY:  Retired.  .  Denies substance abuse.  Drinks alcohol 1-2

times per month.  Drinks coffee and soda daily.



ALLERGIES:  STATINS, PREDNISONE, DEXAMETHASONE, METFORMIN and IV CONTRAST DYE.



CURRENT MEDICATIONS:  Losartan, doxazosin, fluticasone propionate, ranitidine,

Zyrtec and Aleve.



REVIEW OF SYSTEMS:  A 12-point review of systems was obtained and is

noncontributory except that mentioned above.



PHYSICAL EXAMINATION:

NEUROSURGERY EXAMINATION:

GENERAL APPEARANCE:  Alert, pleasant, no acute distress.

HEAD:  Normocephalic and atraumatic.

SKIN:  Warm and dry.

MUSCULOSKELETAL:  Cervical paraspinal muscle bulk was normal, cervical range of

motion is restricted, normal range of motion of the upper extremities

bilaterally.

EXTREMITIES:  No clubbing, cyanosis or edema.

NEUROLOGIC:  Alert and oriented x 3, normal recent and remote memory, strength

5/5 in bilateral upper and lower extremities except for a 4+/5 left biceps

strength, sensory was intact to light touch in the upper and lower extremities,

reflexes were present and symmetric and 1+ in the upper extremities, 2+ at the

knees and 2+ at the ankles bilaterally.  Unsteady gait.  Unable to tandem walk.



IMAGING:  I reviewed a cervical MRI scan from 10/12/2018.  On that

study, there is severe spinal stenosis present at C4-C5 and C5-C6.  At C4-C5,

the spinal canal is narrowed to about 4 mm.  At C5-C6, the spinal canal is

narrowed to about 4-5 mm.  There is also significant left lateral recess

stenosis and foraminal narrowing at C5-C6.  At C6-C7, the canal is narrowed to

about 7 mm.  There is also moderate narrowing of the left neural foramen at this

level.



ASSESSMENT/

PLAN:  I believe the problems at C4-C5 and C5-C6 are primarily responsible for

his symptoms.  I recommended 2-level anterior cervical diskectomy and fusion.  I

discussed this with him in detail.  I explained the surgery and the risks

involved.  I also discussed that in the future, he may require posterior

cervical microdecompression surgery at C6-C7.  I outlined in addition, the risks

including paralysis.  I spoke about the expected postoperative course.  He

understands.  He would like to go ahead.  We will make the arrangements.

 



______________________________

YUNIOR BROUSSARD MD



DR:  ABDIAZIZ/rosa elena  JOB#:  [id number] / [id number]

DD:  12/05/2018 14:56  DT:  12/05/2018 15:42

BRITNEY

## 2018-12-06 ENCOUNTER — HOSPITAL ENCOUNTER (INPATIENT)
Dept: HOSPITAL 61 - SURG | Age: 68
LOS: 1 days | Discharge: HOME | DRG: 472 | End: 2018-12-07
Attending: NEUROLOGICAL SURGERY | Admitting: NEUROLOGICAL SURGERY
Payer: MEDICARE

## 2018-12-06 VITALS — HEIGHT: 69 IN | BODY MASS INDEX: 34.8 KG/M2 | WEIGHT: 235 LBS

## 2018-12-06 VITALS — SYSTOLIC BLOOD PRESSURE: 115 MMHG | DIASTOLIC BLOOD PRESSURE: 75 MMHG

## 2018-12-06 VITALS — DIASTOLIC BLOOD PRESSURE: 77 MMHG | SYSTOLIC BLOOD PRESSURE: 125 MMHG

## 2018-12-06 VITALS — SYSTOLIC BLOOD PRESSURE: 133 MMHG | DIASTOLIC BLOOD PRESSURE: 84 MMHG

## 2018-12-06 VITALS — SYSTOLIC BLOOD PRESSURE: 123 MMHG | DIASTOLIC BLOOD PRESSURE: 78 MMHG

## 2018-12-06 VITALS — SYSTOLIC BLOOD PRESSURE: 108 MMHG | DIASTOLIC BLOOD PRESSURE: 67 MMHG

## 2018-12-06 DIAGNOSIS — M47.12: ICD-10-CM

## 2018-12-06 DIAGNOSIS — M47.22: ICD-10-CM

## 2018-12-06 DIAGNOSIS — M48.02: Primary | ICD-10-CM

## 2018-12-06 DIAGNOSIS — M50.122: ICD-10-CM

## 2018-12-06 DIAGNOSIS — Z88.8: ICD-10-CM

## 2018-12-06 DIAGNOSIS — Z83.3: ICD-10-CM

## 2018-12-06 DIAGNOSIS — I10: ICD-10-CM

## 2018-12-06 DIAGNOSIS — G43.909: ICD-10-CM

## 2018-12-06 DIAGNOSIS — M50.021: ICD-10-CM

## 2018-12-06 DIAGNOSIS — Z91.041: ICD-10-CM

## 2018-12-06 PROCEDURE — A7015 AEROSOL MASK USED W NEBULIZE: HCPCS

## 2018-12-06 PROCEDURE — 0RG20A0 FUSION OF 2 OR MORE CERVICAL VERTEBRAL JOINTS WITH INTERBODY FUSION DEVICE, ANTERIOR APPROACH, ANTERIOR COLUMN, OPEN APPROACH: ICD-10-PCS | Performed by: NEUROLOGICAL SURGERY

## 2018-12-06 PROCEDURE — 0RB30ZZ EXCISION OF CERVICAL VERTEBRAL DISC, OPEN APPROACH: ICD-10-PCS | Performed by: NEUROLOGICAL SURGERY

## 2018-12-06 PROCEDURE — 4A11X4G MONITORING OF PERIPHERAL NERVOUS ELECTRICAL ACTIVITY, INTRAOPERATIVE, EXTERNAL APPROACH: ICD-10-PCS | Performed by: NEUROLOGICAL SURGERY

## 2018-12-06 PROCEDURE — 76000 FLUOROSCOPY <1 HR PHYS/QHP: CPT

## 2018-12-06 PROCEDURE — C1713 ANCHOR/SCREW BN/BN,TIS/BN: HCPCS

## 2018-12-06 RX ADMIN — SODIUM CHLORIDE, SODIUM LACTATE, POTASSIUM CHLORIDE, AND CALCIUM CHLORIDE SCH MLS/HR: .6; .31; .03; .02 INJECTION, SOLUTION INTRAVENOUS at 15:17

## 2018-12-06 RX ADMIN — FENTANYL CITRATE PRN MCG: 50 INJECTION INTRAMUSCULAR; INTRAVENOUS at 15:59

## 2018-12-06 RX ADMIN — HYDROCODONE BITARTRATE AND ACETAMINOPHEN PRN TAB: 5; 325 TABLET ORAL at 18:47

## 2018-12-06 RX ADMIN — FENTANYL CITRATE PRN MCG: 50 INJECTION INTRAMUSCULAR; INTRAVENOUS at 16:15

## 2018-12-06 RX ADMIN — FENTANYL CITRATE PRN MCG: 50 INJECTION INTRAMUSCULAR; INTRAVENOUS at 15:44

## 2018-12-06 RX ADMIN — DEXTROSE, SODIUM CHLORIDE, AND POTASSIUM CHLORIDE SCH MLS/HR: 5; .45; .15 INJECTION INTRAVENOUS at 20:43

## 2018-12-06 RX ADMIN — CEFAZOLIN SCH MLS/HR: 1 INJECTION, POWDER, FOR SOLUTION INTRAMUSCULAR; INTRAVENOUS at 20:37

## 2018-12-06 RX ADMIN — METHOCARBAMOL SCH MG: 750 TABLET ORAL at 17:43

## 2018-12-06 RX ADMIN — FENTANYL CITRATE PRN MCG: 50 INJECTION INTRAMUSCULAR; INTRAVENOUS at 15:22

## 2018-12-06 RX ADMIN — SODIUM CHLORIDE, SODIUM LACTATE, POTASSIUM CHLORIDE, AND CALCIUM CHLORIDE SCH MLS/HR: .6; .31; .03; .02 INJECTION, SOLUTION INTRAVENOUS at 09:06

## 2018-12-06 RX ADMIN — DOCUSATE SODIUM SCH MG: 100 CAPSULE, LIQUID FILLED ORAL at 20:36

## 2018-12-06 RX ADMIN — METHOCARBAMOL SCH MG: 750 TABLET ORAL at 20:37

## 2018-12-06 RX ADMIN — CETIRIZINE HYDROCHLORIDE SCH MG: 10 TABLET, FILM COATED ORAL at 16:00

## 2018-12-07 VITALS
SYSTOLIC BLOOD PRESSURE: 132 MMHG | DIASTOLIC BLOOD PRESSURE: 86 MMHG | DIASTOLIC BLOOD PRESSURE: 86 MMHG | SYSTOLIC BLOOD PRESSURE: 132 MMHG | SYSTOLIC BLOOD PRESSURE: 132 MMHG | SYSTOLIC BLOOD PRESSURE: 132 MMHG | DIASTOLIC BLOOD PRESSURE: 86 MMHG | SYSTOLIC BLOOD PRESSURE: 132 MMHG | DIASTOLIC BLOOD PRESSURE: 86 MMHG | SYSTOLIC BLOOD PRESSURE: 132 MMHG | DIASTOLIC BLOOD PRESSURE: 86 MMHG | SYSTOLIC BLOOD PRESSURE: 132 MMHG | DIASTOLIC BLOOD PRESSURE: 86 MMHG | DIASTOLIC BLOOD PRESSURE: 86 MMHG

## 2018-12-07 VITALS — DIASTOLIC BLOOD PRESSURE: 85 MMHG | SYSTOLIC BLOOD PRESSURE: 124 MMHG

## 2018-12-07 VITALS — SYSTOLIC BLOOD PRESSURE: 132 MMHG | DIASTOLIC BLOOD PRESSURE: 90 MMHG

## 2018-12-07 RX ADMIN — HYDROCODONE BITARTRATE AND ACETAMINOPHEN PRN TAB: 5; 325 TABLET ORAL at 08:12

## 2018-12-07 RX ADMIN — METHOCARBAMOL SCH MG: 750 TABLET ORAL at 08:12

## 2018-12-07 RX ADMIN — CETIRIZINE HYDROCHLORIDE SCH MG: 10 TABLET, FILM COATED ORAL at 08:13

## 2018-12-07 RX ADMIN — DEXTROSE, SODIUM CHLORIDE, AND POTASSIUM CHLORIDE SCH MLS/HR: 5; .45; .15 INJECTION INTRAVENOUS at 03:44

## 2018-12-07 RX ADMIN — METHOCARBAMOL SCH MG: 750 TABLET ORAL at 14:00

## 2018-12-07 RX ADMIN — CEFAZOLIN SCH MLS/HR: 1 INJECTION, POWDER, FOR SOLUTION INTRAMUSCULAR; INTRAVENOUS at 11:32

## 2018-12-07 RX ADMIN — DOCUSATE SODIUM SCH MG: 100 CAPSULE, LIQUID FILLED ORAL at 08:11

## 2018-12-07 RX ADMIN — CEFAZOLIN SCH MLS/HR: 1 INJECTION, POWDER, FOR SOLUTION INTRAMUSCULAR; INTRAVENOUS at 03:33

## 2018-12-07 NOTE — DISCH
DISCHARGE INSTRUCTIONS


Condition on Discharge


Condition on Discharge:  Stable





Activity After Discharge


Activity Instructions for Disc:  Activity as tolerated, Avoid exertion, 

Progressive ambulation


Other activity instructions:  Do not drive for one week


Bathing Instructions:  Shower-keep dressing dry, No Tub Bath until see 


Lifting Instructions after Dis:  No heavy lifting, No pulling or pushing, Do 

not lift >10 pounds


Exercise Instruction after Dis:  Exercise per therapy, Progress as tolerated


Driving Instructions after Dis:  Do not drive, Other, see below


Weight Bearing Status after Di:  Other, see below





Diet after Discharge


Diet after Discharge:  Regular


Liquid Texture:  Thin Liquid





Wound Incision Care


Wound/Incision Care:  Ice to area for comfort, Keep wound/cast CDI, Keep wound 

elevated, Change dressing, May get incision wet, Reinforce dressing PRN


Other wound/incision instructi:  may remove dressing in 48 hrs if dry then may 

shower, no soaking


Wound Care Equipment:  Dressings





Checks after Discharge


Checks after discharge:  Check blood press - daily





Contacting the  after DC


Call your doctor for:  Concerns you may have





Follow-Up


Follow Up With:  Dr Broussard's nurse in 10-14 days [phone]





Treatment/Equipment after DC


Adaptive Equipment Issued:  None











YUNIOR BROUSSARD MD Dec 7, 2018 13:13

## 2018-12-07 NOTE — OP
DATE OF SURGERY:  12/06/2018



PREOPERATIVE DIAGNOSIS:  Cervical spinal stenosis and cervical radiculopathy,

C4-C5, C5-C6.



POSTOPERATIVE DIAGNOSIS:  Cervical spinal stenosis and cervical radiculopathy,

C4-C5, C5-C6.



OPERATION PERFORMED:  Anterior cervical microdiscectomy, C4-C5, C5-C6.  Anterior

cervical interbody fusion C4-C5, C5-C6 with allograft and autograft bone and

interbody fusion cage.  Anterior cervical plate, C4, C5, C6.  The operation was

done with multimodality monitoring including NIMS monitoring, motor evoked

potentials, somatosensory evoked potentials, EMG monitoring.  The operation also

included fluoroscopy and microscopy.



SURGEON: Neel Broussard M.D.



ASSISTANT:  DARREN Pang assisted with the surgery.  She assisted with

the microdecompression at both C4-C5 and C5-C6.



OPERATIVE INDICATIONS:  The patient is a very pleasant 68-year-old man who

developed problems with intractable neck and predominantly left arm pain along

with numbness and pain in his left leg.  He also noticed unsteadiness with

walking.  On imaging studies, he had severe stenosis at C4-C5 and C5-C6 and

moderate stenosis at C6-C7, and I felt that the primary symptoms were related to

his problems at C4-C5 and C5-C6 and recommended a 2-level anterior cervical

microdiscectomy and fusion.  I spoke with him about the surgery and the risks,

the technique and expected postoperative course and he understood and he wished

to go ahead.



DESCRIPTION OF PROCEDURE:  Following general endotracheal anesthesia, the

patient was positioned supine on the operating room table.  The head gently

positioned on a donut.  There was considerable care performed with anesthesia to

avoid any changes in the neck alignment during the intubation.  This was done

very carefully.  The anterior cervical region was then prepped and draped in the

standard fashion.  ANURADHA hose and AV impulse boots were applied for DVT

prophylaxis.  A microscope was draped.  Fluoroscopy was draped and brought into

the field and monitoring was established.  Ancef 2 g was given less than 1 hour

prior to initiation of the surgery.  Using fluoroscopic guidance, an incision

was made from the midline around to the right side in a skin crease.  I

dissected down through skin, subcutaneous tissue, incised the platysma, then

dissected around the medial aspect of the sternocleidomastoid and carotid artery

sheath down the anterior cervical vertebral bodies.  I confirmed my position

fluoroscopically.  I placed anterior cervical retractors wedged in the longus

colli muscles at C5-C6 and I placed 14 mm pins in C5 and C6.  The remainder of

surgery was done with a microscope using microscopic technique.  I incised the

anterior annulus with #11 blade.  I performed a discectomy with pituitary

rongeurs.  I drilled the anterior spurring and saved this bone for the bone

fusion.  I did remove the disc and scraped the endplates with the endplate

scraper.  I drilled the posterior spurring and then opened widely bilaterally. 

There was moderately large subligamentous disc herniation and I removed this

material and fully decompressed the region.  I worked out and I assured that the

foramina were open.  I measured and placed an 8 mm interbody fusion cage, which

was packed with allograft and autograft bone after obtaining excellent

hemostasis.  I then removed the pin from C6 and was retracted superiorly to

C4-C5 and I performed the identical operation at C4-C5.  At this level, there

was considerable disc material, which had also passed behind the body of C4 and

I did trim off the inferior aspect of the C4 vertebral body to allow me to gain

superior exposure and gently teased back and removed multiple disc fragments. 

As I worked, the region became very well decompressed.  I did obtain perfect

hemostasis.  I irrigated copiously.  I opened the annulus as well as the

ligament during this process and I was able to pass out the foramina without

difficulty.  I scraped away the cartilaginous endplate.  I placed an 8 mm

interbody fusion cage, which was packed with allograft and autograft bone after

perfect hemostasis had been obtained.  I then placed a 34 mm plate and used six

14 mm screws to secure this.  The superior and inferior screws were first placed

followed by the remaining screws, which were all locked. I used the [employer]

[employer] system.  I irrigated copiously

and removed the retractor and explored carefully.  Hemostasis was perfect.  I

closed the platysma as a separate layer.  I then closed subcutaneous tissues. 

The skin was closed with 4-0 subcuticular stitch.  The operation went very 
well. 

The patient was taken to the recovery room in excellent condition and virtually

immediately noticed marked improvement in his overall symptoms.  I was quite

pleased with the surgery.

 



______________________________

NEEL BROUSSARD MD



DR:  ABDIAZIZ/rosa elena  JOB#:  [id number] / [id number]

DD:  12/07/2018 18:36  DT:  12/07/2018 19:02

BRITNEY

## 2018-12-10 NOTE — PATHOLOGY
[facility] Accession Number: [id number]

.                                                                01

Material submitted:                                        .

CERVICAL DISC

.                                                                01

Clinical history:                                          .

Cervical stenosis

.                                                                02

**********************************************************************

Diagnosis:

Segments of fibrocartilaginous tissue and bone, cervical disc:

- Degenerative changes of fibrocartilaginous tissue.

.

(JPM:mml; 12/10/18)

[facility]/12/10/2018

**********************************************************************

.                                                                02

Comment:

There is no evidence of an acute inflammatory process or malignancy.

.

(JPM:mml; 12/10/18)

.                                                                02

Electronically signed:                                     .

Kev Campoverde MD, Pathologist

NPI- [id number]

.                                                                01

Gross description:                                         .

Received in formalin labeled "Bedoya, [place], cervical disc," are several

pieces of glistening, fibrous tissue measuring 4.9 x 2.7 x 0.6 cm in

aggregate dimensions, containing small fragments of possible bone.  The

tissue submitted representatively in cassette A1, following

decalcification.

(TSD; 12/7/2018)

TOB/TOB

.                                                                02

Pathologist provided ICD-10:

M50.30

.                                                                02

CPT                                                        .

299402, 199419

Specimen Comment: A courtesy copy of this report has been sent to

Specimen Comment: [phone], [phone].

Specimen Comment: Report sent to  / DR HERNDON

***Performed at:  01

   [place] [id number] [facility] [address], [place]  [id number]

   MD Pepito Mckeon MD Phone:  [phone]

***Performed at:  02

   [facility]

   [address] [place], [place]  [id number]

   MD Kev Campoverde MD Phone:  [phone]

## 2019-02-28 ENCOUNTER — HOSPITAL ENCOUNTER (OUTPATIENT)
Dept: HOSPITAL 61 - KCIC | Age: 69
Discharge: HOME | End: 2019-02-28
Attending: NEUROLOGICAL SURGERY
Payer: MEDICARE

## 2019-02-28 DIAGNOSIS — M47.812: ICD-10-CM

## 2019-02-28 DIAGNOSIS — M43.22: Primary | ICD-10-CM

## 2019-02-28 DIAGNOSIS — M48.02: ICD-10-CM

## 2019-02-28 PROCEDURE — 72040 X-RAY EXAM NECK SPINE 2-3 VW: CPT

## 2019-02-28 NOTE — [facility]
EXAM: Cervical spine, 4 views.

 

HISTORY: Fusion.

 

COMPARISON: None.

 

FINDINGS: 4 views of the cervical spine are obtained. There is 

instrumented intraspinal fusion and interbody fusion at C4 through C6. The

vertebral bodies are normal in height. There is no listhesis. There is 

slight disc space narrowing and endplate remodeling at C6-C7.

 

IMPRESSION: 

1. Instrumented fusion at C4-C6.

2. Mild degenerative change at C6-C7.

 

Electronically signed by: Nan Gutierrez MD (2/28/2019 2:58 PM) [place] [id number]

## 2019-10-14 ENCOUNTER — HOSPITAL ENCOUNTER (OUTPATIENT)
Dept: HOSPITAL 61 - RT | Age: 69
Discharge: HOME | End: 2019-10-14
Attending: INTERNAL MEDICINE
Payer: MEDICARE

## 2019-10-14 DIAGNOSIS — G47.33: Primary | ICD-10-CM

## 2019-10-14 DIAGNOSIS — G47.34: ICD-10-CM

## 2019-10-16 NOTE — SLEEP
DATE OF STUDY:  10/14/2019



HOME SLEEP STUDY



REFERRING PHYSICIAN:  Dr. Nas Tan



The patient is a 69-year-old who weighs 230 pounds with a BMI of 34.  The

patient's Cranford score was 14.  The patient underwent home sleep study

performed at [facility].  Total recording time was 371 minutes.



During the night study, the patient had 76 obstructive apneas, no central

apneas, 98 mixed apneas and 109 hypopneas.  The patient's apnea hypopnea index

was 45.7 per hour.



Nocturnal oximetry study revealed an average oxygen saturation of 89% with

lowest of 78%.  246 minutes were spent in oxygen saturation less than 90%.



Mean heart rate 71 beats per minute.



IMPRESSION:

1.  Severe sleep apnea-hypopnea syndrome at an AHI of 45.7 per hour.

2.  Moderate-to-severe nocturnal hypoxia secondary to obstructive sleep apnea.



RECOMMENDATIONS:

1.  The patient would benefit from treatment of sleep apnea with CPAP.  This can

be performed as in-lab CPAP titration study versus a home auto CPAP titration

study.

2.  Once the patient is optimally treated with CPAP, then follow up in 4-6 weeks

to assess compliance and to document clinical improvement.

3.  Weight loss is strongly advised.

4.  Avoid CNS depressants.

5.  Cautioned regarding driving until symptoms of sleep apnea resolve with the

use of CPAP.

 



______________________________

MD EMERSON CARMEN/rosa elena  JOB#:  [id number] / [id number]

DD:  10/16/2019 10:53  DT:  10/16/2019 11:10



NAS Tellez MD

## 2019-11-08 ENCOUNTER — HOSPITAL ENCOUNTER (OUTPATIENT)
Dept: HOSPITAL 61 - RT | Age: 69
Discharge: HOME | End: 2019-11-08
Attending: INTERNAL MEDICINE
Payer: MEDICARE

## 2019-11-08 DIAGNOSIS — G47.33: Primary | ICD-10-CM

## 2019-11-08 PROCEDURE — 95811 POLYSOM 6/>YRS CPAP 4/> PARM: CPT

## 2019-11-12 NOTE — SLEEP
DATE OF STUDY:  11/08/2019



SLEEP STUDY



ATTENDING PHYSICIAN:  Dr. Yanet Tan.



SUBJECTIVE:  The patient is a 69-year-old who weighs 235 pounds with a BMI of

35.  The patient's Salem score is 15.  The patient had a home sleep study and

was found to have severe CLEM at an AHI of 45 per hour.  The patient was referred

for in-lab CPAP titration study.



During the night study, the patient spent 425 minutes in bed and slept for 305

minutes with a sleep efficiency of 72%.  Sleep latency was 93 minutes with a REM

latency of 120 minutes.  Sleep architecture showed normal stage 1 and stage 2

sleep, increased slow wave and reduced REM sleep.



EKG monitoring revealed no sustained arrhythmias.  Average heart rate 67 beats

per minute.



PLMS were seen at index of 55 per hour and 3 per hour caused EEG arousals.



The patient was started on CPAP at 5 cm water and titrated up to 7 cm water.  At

the final pressure, the patient slept for 305 minutes.  The patient had lateral

REM sleep.  The patient's AHI was reduced to 2 per hour and oxygen saturation

remained above 89%.  The patient used medium size nasal pillows.



IMPRESSION:

1.  Severe sleep apnea diagnosed by home sleep study.

2.  Severe periodic limb movements.



RECOMMENDATIONS:

1.  CPAP at 7 cm water completely eliminated the patient's sleep apnea and

should be used on a nightly basis.

2.  Follow up in 4-6 weeks to assess compliance with CPAP and to document

clinical improvement.

3.  Weight loss is strongly advised.

4.  Avoid CNS depressants.

5.  Cautioned regarding driving until symptoms of sleep apnea resolve with the

use of CPAP.

6.  The patient should also be further evaluated for symptoms of restless legs

during the day.

 



______________________________

MOISÉS SHAH MD



DR:  CANDIDA/rosa elena  JOB#:  [id number] / [id number]

DD:  11/12/2019 14:04  DT:  11/12/2019 14:30



YANET Tellez MD

## 2019-12-03 ENCOUNTER — HOSPITAL ENCOUNTER (OUTPATIENT)
Dept: HOSPITAL 61 - NM | Age: 69
Discharge: HOME | End: 2019-12-03
Attending: UROLOGY
Payer: MEDICARE

## 2019-12-03 DIAGNOSIS — C61: Primary | ICD-10-CM

## 2019-12-03 PROCEDURE — A9503 TC99M MEDRONATE: HCPCS

## 2019-12-03 PROCEDURE — 78306 BONE IMAGING WHOLE BODY: CPT

## 2021-01-01 NOTE — RAD
Whole body bone scan

 

Clinical indications: Prostate cancer.

 

COMPARISON: No previous bone scan available.

 

TECHNIQUE: After IV infusion of 25 mCi of technetium 99m MDP, delayed 

anterior and posterior planar images of the whole body were performed.

 

FINDINGS: Bilateral renal function is evident. No radiotracer accumulation

is seen to indicate osseous metastatic disease scintigraphically. No 

abnormal focal uptake is seen.

 

IMPRESSION: No osseous metastatic disease is seen scintigraphically.

 

Electronically signed by: Jonny Bajwa MD (12/3/2019 5:25 PM) [facility] Principal Discharge DX:	Hyperbilirubinemia

## 2021-07-23 ENCOUNTER — HOSPITAL ENCOUNTER (OUTPATIENT)
Dept: HOSPITAL 61 - KCIC | Age: 71
End: 2021-07-23
Attending: FAMILY MEDICINE
Payer: MEDICARE

## 2021-07-23 DIAGNOSIS — I70.0: ICD-10-CM

## 2021-07-23 DIAGNOSIS — M47.814: ICD-10-CM

## 2021-07-23 DIAGNOSIS — R05: Primary | ICD-10-CM

## 2021-07-23 PROCEDURE — 71046 X-RAY EXAM CHEST 2 VIEWS: CPT

## 2021-07-23 NOTE — KCIC
EXAMINATION: XR CHEST 2V



CLINICAL HISTORY: COUGH IN THE MORNINGS XS 1 YEAR, NON SMOKER 



EXAM DATE/TIME: 7/23/2021 10:30 AM



COMPARISON: 9/9/2017





FINDINGS: 



Lines, Tubes, and Devices: None.



Cardiomediastinal Silhouette: Normal heart size. Aortic atherosclerotic calcification.



Lungs and Pleura: Minimal patchy bibasilar opacities, possibly subsegmental atelectasis. No evidence 
of pleural effusion. Pulmonary vasculature unremarkable.



Bones and Soft Tissues: Degenerative changes of the thoracic spine.

 



IMPRESSION:



No definitive evidence of acute cardiopulmonary abnormality.



Minimal patchy bibasilar airspace disease, possibly subsegmental atelectasis.



Electronically signed by: Celso Phelan DO (7/23/2021 3:38 PM) [id number]

## 2021-11-01 ENCOUNTER — HOSPITAL ENCOUNTER (OUTPATIENT)
Dept: HOSPITAL 61 - KCIC | Age: 71
End: 2021-11-01
Attending: FAMILY MEDICINE
Payer: MEDICARE
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
## 2021-11-01 DIAGNOSIS — S92.425A: Primary | ICD-10-CM
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
## 2021-11-01 PROCEDURE — 73110 X-RAY EXAM OF WRIST: CPT

## 2021-11-01 PROCEDURE — 73610 X-RAY EXAM OF ANKLE: CPT

## 2021-11-01 PROCEDURE — 73130 X-RAY EXAM OF HAND: CPT

## 2021-11-01 PROCEDURE — 73630 X-RAY EXAM OF FOOT: CPT

## 2021-11-01 NOTE — KCIC
EXAM:  XR LT WRIST 3VIEWS, XR HAND_LEFT 3 VIEWS, XR EXAM OF ANKLE_LEFT 3V, XR FOOT_LEFT 3 VIEWS 11/1/
2021 11:45 AM



CLINICAL INDICATION:  Injury, left wrist pain, foot, and ankle pain after fall



COMPARISON:  None



FINDINGS:  



Left hand: No acute fracture. Alignment is normal. Joint spaces are maintained. No soft tissue abnorm
ality.



Left wrist radiograph: No acute fracture. Alignment is normal. Joint spaces are maintained. No focal 
soft tissue abnormality.



Left ankle: No acute fracture. There is a chronic osseous fragment at the tip of the lateral malleolu
s, likely sequela of old injury. The ankle mortise is symmetric and talar dome is intact. No soft tis
niranjan swelling.



Left foot: There is a suspected nondisplaced, comminuted intra-articular fracture of the great toe di
stal phalanx. No definite other fracture. Alignment is normal. Joint spaces are maintained. Soft tiss
ue is normal.



IMPRESSION:  

1. Nondisplaced comminuted fracture of the great toe distal phalanx.

2. No acute osseous abnormality of the left ankle.

3. No acute osseous abnormality of the left hand or wrist.



Electronically signed by: Amena Oconnor MD (11/1/2021 3:40 PM) [id number]

## 2021-11-01 NOTE — KCIC
EXAM:  XR LT WRIST 3VIEWS, XR HAND_LEFT 3 VIEWS, XR EXAM OF ANKLE_LEFT 3V, XR FOOT_LEFT 3 VIEWS 11/1/
2021 11:45 AM



CLINICAL INDICATION:  Injury, left wrist pain, foot, and ankle pain after fall



COMPARISON:  None



FINDINGS:  



Left hand: No acute fracture. Alignment is normal. Joint spaces are maintained. No soft tissue abnorm
ality.



Left wrist radiograph: No acute fracture. Alignment is normal. Joint spaces are maintained. No focal 
soft tissue abnormality.



Left ankle: No acute fracture. There is a chronic osseous fragment at the tip of the lateral malleolu
s, likely sequela of old injury. The ankle mortise is symmetric and talar dome is intact. No soft tis
niranjan swelling.



Left foot: There is a suspected nondisplaced, comminuted intra-articular fracture of the great toe di
stal phalanx. No definite other fracture. Alignment is normal. Joint spaces are maintained. Soft tiss
ue is normal.



IMPRESSION:  

1. Nondisplaced comminuted fracture of the great toe distal phalanx.

2. No acute osseous abnormality of the left ankle.

3. No acute osseous abnormality of the left hand or wrist.



Electronically signed by: Amena Oconnor MD (11/1/2021 3:40 PM) [id number]

## 2021-11-01 NOTE — KCIC
EXAM:  XR LT WRIST 3VIEWS, XR HAND_LEFT 3 VIEWS, XR EXAM OF ANKLE_LEFT 3V, XR FOOT_LEFT 3 VIEWS 11/1/
2021 11:45 AM



CLINICAL INDICATION:  Injury, left wrist pain, foot, and ankle pain after fall



COMPARISON:  None



FINDINGS:  



Left hand: No acute fracture. Alignment is normal. Joint spaces are maintained. No soft tissue abnorm
ality.



Left wrist radiograph: No acute fracture. Alignment is normal. Joint spaces are maintained. No focal 
soft tissue abnormality.



Left ankle: No acute fracture. There is a chronic osseous fragment at the tip of the lateral malleolu
s, likely sequela of old injury. The ankle mortise is symmetric and talar dome is intact. No soft tis
niranjan swelling.



Left foot: There is a suspected nondisplaced, comminuted intra-articular fracture of the great toe di
stal phalanx. No definite other fracture. Alignment is normal. Joint spaces are maintained. Soft tiss
ue is normal.



IMPRESSION:  

1. Nondisplaced comminuted fracture of the great toe distal phalanx.

2. No acute osseous abnormality of the left ankle.

3. No acute osseous abnormality of the left hand or wrist.



Electronically signed by: Amena Oconnor MD (11/1/2021 3:40 PM) [id number]

## 2021-11-01 NOTE — KCIC
EXAM:  XR LT WRIST 3VIEWS, XR HAND_LEFT 3 VIEWS, XR EXAM OF ANKLE_LEFT 3V, XR FOOT_LEFT 3 VIEWS 11/1/
2021 11:45 AM



CLINICAL INDICATION:  Injury, left wrist pain, foot, and ankle pain after fall



COMPARISON:  None



FINDINGS:  



Left hand: No acute fracture. Alignment is normal. Joint spaces are maintained. No soft tissue abnorm
ality.



Left wrist radiograph: No acute fracture. Alignment is normal. Joint spaces are maintained. No focal 
soft tissue abnormality.



Left ankle: No acute fracture. There is a chronic osseous fragment at the tip of the lateral malleolu
s, likely sequela of old injury. The ankle mortise is symmetric and talar dome is intact. No soft tis
niranjan swelling.



Left foot: There is a suspected nondisplaced, comminuted intra-articular fracture of the great toe di
stal phalanx. No definite other fracture. Alignment is normal. Joint spaces are maintained. Soft tiss
ue is normal.



IMPRESSION:  

1. Nondisplaced comminuted fracture of the great toe distal phalanx.

2. No acute osseous abnormality of the left ankle.

3. No acute osseous abnormality of the left hand or wrist.



Electronically signed by: Amena Oconnor MD (11/1/2021 3:40 PM) [id number]

## 2024-01-05 NOTE — PHYS DOC
Past Medical History


Past Medical History:  Diabetes-Type II, High Cholesterol, Hypertension, Other


Additional Past Medical Histor:  hernia


Past Surgical History:  Other


Additional Past Surgical Histo:  BILATERAL SHOULDER REPAIR; (L) KNEE REPAIR; 

hernia


Alcohol Use:  Occasionally


Drug Use:  None





Adult General


Chief Complaint


Chief Complaint:  URINARY RETENTION





HPI


HPI





Patient is a 67  year old male who presents with urinary retention.  The 

patient states he had inguinal hernia repair at about 0900 this morning by Dr. Velez.  He has not been able to urinate since before the surgery. He does 

have suprapubic fullness and discomfort. Denies fevers or chills, nausea or 

vomiting, flank pain. No history of BPH or urinary retention. He called Dr. Estrada who referred him here for further evaluation.





Review of Systems


Review of Systems


Constitutional: Denies fever or chills 


HENT: Denies nasal congestion or sore throat 


Respiratory: Denies cough or shortness of breath 


Cardiovascular:  Denies chest pain 


GI: Denies abdominal pain, nausea, vomiting, or diarrhea


: Reports urinary retention


Musculoskeletal: Denies back pain or joint pain 


Integument: Denies rash or skin lesions 


Neurologic: Denies headache





Allergies


Allergies





Allergies








Coded Allergies Type Severity Reaction Last Updated Verified


 


  iodine Allergy Severe Anaphylaxis 9/8/17 Yes


 


  metformin Allergy Intermediate  9/8/17 Yes


 


  morphine Allergy Intermediate Nausea and Vomiting 9/8/17 Yes


 


  naproxen sodium Allergy Intermediate Rash 9/8/17 Yes


 


  simvastatin Allergy Intermediate  9/8/17 Yes











Physical Exam


Physical Exam


Constitutional: Obese, no acute distress, non-toxic appearance. 


HENT: Normocephalic, atraumatic, bilateral external ears normal, oropharynx 

moist, nose normal.


Eyes: conjunctiva normal, no discharge.


Neck: supple, no stridor.


Cardiovascular:  RRR, no murmurs, no edema. 


Lungs & Thorax:  LCTAB, no wheezing, no respiratory distress.


Abdomen: soft, suprapubic tenderness without rebound or guarding, no masses or 

pulsatile masses, nondistended. Surgical bandages in place, clean/dry/intact.


Skin: Warm, dry, no erythema, no rash.


Back: No CVA tenderness.


Extremities: No tenderness, no edema. 


Neurologic: Alert and oriented X 3, no focal deficits noted. 


Psychologic: Affect normal, judgement normal, mood normal.





Current Patient Data


Vital Signs





 Vital Signs








  Date Time  Temp Pulse Resp B/P (MAP) Pulse Ox O2 Delivery O2 Flow Rate FiO2


 


9/8/17 16:10 98.3 76 18 140/90 (107) 92 Room Air  





 98.3       








Lab Values





 Laboratory Tests








Test


  9/8/17


16:35


 


Urine Collection Type U cath  


 


Urine Color Yellow  


 


Urine Clarity Clear  


 


Urine pH 6.5  


 


Urine Specific Gravity 1.010  


 


Urine Protein


  Negative mg/dL


(NEG-TRACE)


 


Urine Glucose (UA)


  Negative mg/dL


(NEG)


 


Urine Ketones (Stick)


  Negative mg/dL


(NEG)


 


Urine Blood


  Negative (NEG)


 


 


Urine Nitrite


  Negative (NEG)


 


 


Urine Bilirubin


  Negative (NEG)


 


 


Urine Urobilinogen Dipstick


  0.2 mg/dL (0.2


mg/dL)


 


Urine Leukocyte Esterase


  Negative (NEG)


 


 


Urine RBC 0 /HPF (0-2)  


 


Urine WBC 0 /HPF (0-4)  


 


Urine Renal Epithelial Cells Few /LPF  


 


Urine Bacteria


  0 /HPF (0-FEW)


 











EKG


EKG


[]





Radiology/Procedures


Radiology/Procedures


[]





Course & Med Decision Making


Course & Med Decision Making


Pertinent Labs and Imaging studies reviewed. (See chart for details)


The patient presents with acute urinary retention. RN performed straight 

catheterization with greater than 600 mL of urine output. Dr. Estrada had 

advised that the patient would most likely be able to spontaneously void after 

initial drainage of bladder, so did not recommend that he go home with evangelista.  

Dr. Lei took the call from Dr. Estrada prior to arrival.  The patient had 

significant relief of his symptoms. Recommended labs and UA. The patient stated 

he had already been at the hospital all day and didn't want to stay any longer. 

He declined labs.  I informed him that he could have lab abnormalities 

contributing to this condition, such as acute renal failure. He does not want 

to stay and understands risks of leaving including undiagnosed condition. 

Recommend follow-up with Dr. Velez in 2-3 days.  Come back for high fever, 

severe pain, uncontrolled vomiting, recurrence of urinary retention, any 

otherwise worsening condition.  Discharged home in stable condition.  He did 

not wait for discharge papers.


[]





Dragon Disclaimer


Dragon Disclaimer


This electronic medical record was generated, in whole or in part, using a 

voice recognition dictation system.





Departure


Departure


Impression:  


 Primary Impression:  


 Acute urinary retention


Disposition:  01 HOME, SELF-CARE


Condition:  STABLE











EPHRAIM GUPTA MD Sep 8, 2017 17:35
No